# Patient Record
Sex: FEMALE | Race: WHITE | NOT HISPANIC OR LATINO | Employment: OTHER | URBAN - METROPOLITAN AREA
[De-identification: names, ages, dates, MRNs, and addresses within clinical notes are randomized per-mention and may not be internally consistent; named-entity substitution may affect disease eponyms.]

---

## 2017-07-24 ENCOUNTER — ALLSCRIPTS OFFICE VISIT (OUTPATIENT)
Dept: OTHER | Facility: OTHER | Age: 77
End: 2017-07-24

## 2017-09-28 ENCOUNTER — ALLSCRIPTS OFFICE VISIT (OUTPATIENT)
Dept: OTHER | Facility: OTHER | Age: 77
End: 2017-09-28

## 2017-10-31 ENCOUNTER — ALLSCRIPTS OFFICE VISIT (OUTPATIENT)
Dept: OTHER | Facility: OTHER | Age: 77
End: 2017-10-31

## 2017-12-11 ENCOUNTER — ALLSCRIPTS OFFICE VISIT (OUTPATIENT)
Dept: OTHER | Facility: OTHER | Age: 77
End: 2017-12-11

## 2017-12-12 NOTE — PROGRESS NOTES
Assessment    1  Post-menopausal bleeding (627 1) (N95 0)    Plan  Post-menopausal bleeding    · Unlisted Ultrasound  Proc - POC; Status:Active - Perform Order; Requestedfor:58Dhi5433;    Perform: In Office; Order Comments:78 yo  withPMB needs and known myomata needs anatomy review via TVS; Due:35Djq9979; Ordered;bleeding; Ordered By:Delfina Perez;   · Follow-up visit in 1 week Evaluation and Treatment  Follow-up  Status: Hold For -Scheduling  Requested for: 53Gue7742   Ordered;Post-menopausal bleeding; Ordered By: Jenna Siemens Performed:  Due: 31IJJ7711    Discussion/Summary  Discussion Summary:   Schedule embx and TVS  f/u per results  Goals and Barriers: The patient has the current Goals: Delineation of problem  The patent has the current Barriers: 0  Patient's Capacity to Self-Care: Patient is able to Self-Care  History of Present Illness  HPI: Pt is a 77 yo  menopausal for > 15 yrsyrs who presents c/o vaginal bleeding for 1 weeks/months  Pt notes bleeding occurred initially when wiping but now is noted in panties; pt using pantiliner protection 1x daily  Pt notes worse with upright and better with supine positioning  She denies change in voiding  She denies change in BM's  She denies changes in pelvic sensation  Her weight has been stable  She has not been evaluated for this with exam/USG/CT/MRI/UC  Review of Systems   Female ROS: postmenopausal bleeding-- and-- feelings of urinary urgency, but-- no pelvic pain,-- no pelvic pressure-- and-- no dysuria  Focused-Female:  Constitutional: no fever-- and-- no chills  Cardiovascular: the heart rate was not fast   Respiratory: no shortness of breath  Gastrointestinal: constipation, but-- no abdominal pain,-- no nausea,-- no vomiting-- and-- no diarrhea  Genitourinary: as noted in HPI  Musculoskeletal: no limb swelling  Neurological: no confusion  Active Problems  1  Cervical cancer screening (V76 2) (Z12 4)   2   Colon cancer screening (V76 51) (Z12 11)   3  Encounter for gynecological examination without abnormal finding (V72 31) (Z01 419)   4  Inadequate exercise (V69 0) (Z72 3)   5  Urge incontinence of urine (788 31) (N39 41)   6  Urgency of urination (788 63) (R39 15)   7  Urinary frequency (788 41) (R35 0)   8  Urinary urgency (788 63) (R39 15)   9  Visit for screening mammogram (V76 12) (Z12 31)    Past Medical History  1  History of Cancer of the skin, basal cell (173 91) (C44 91)   2  History of Fibroids, intramural (218 1) (D25 1)   3  Denied: History of abnormal cervical Pap smear   4  History of essential hypertension (V12 59) (Z86 79)   5  History of glaucoma (V12 49) (Z86 69)   6  History of hypercholesterolemia (V12 29) (Z86 39)   7  History of pregnancy (V13 29)   8  History of Right nephrolithiasis (592 0) (N20 0)   9  History of Varicella without complication (195 7) (A53 1)  Active Problems And Past Medical History Reviewed: The active problems and past medical history were reviewed and updated today  Surgical History  1  History of Cystoscopy With Insertion Of Ureteral Stent Right   2  History of Diagnostic Cystoscopy   3  History of Endoscopic Laser Therapy   4  History of Removal Of Lesion   5  History of Transurethral Removal Of Internally Dwelling Ureteral Stent  Surgical History Reviewed: The surgical history was reviewed and updated today  Family History  Mother    1  Family history of colon cancer (V16 0) (Z80 0)  Father    2  Family history of colon cancer (V16 0) (Z80 0)    Social History     · Denied: History of Alcohol use   · Denied: History of drug use   · Inadequate exercise (V69 0) (Z72 3)   ·    · Never a smoker  Social History Reviewed: The social history was reviewed and updated today  Current Meds   1  Aspirin 81 MG TABS; TAKE 1 TABLET DAILY; Therapy: (Recorded:22Oct2015) to Recorded   2  Benazepril HCl - 10 MG Oral Tablet; TAKE 1 TABLET DAILY;  Therapy: (Recorded:19Oct2015) to Recorded   3  Choline Fenofibrate 45 MG CPDR; TAKE 1 CAPSULE DAILY; Therapy: (Recorded:19Oct2015) to Recorded   4  Colace 100 MG Oral Capsule; TAKE 1 CAPSULE TWICE DAILY; Therapy: (Recorded:19Oct2015) to Recorded   5  Lipitor 10 MG Oral Tablet; TAKE 1 TABLET DAILY; Therapy: (Recorded:19Oct2015) to Recorded   6  Xalatan 0 005 % Ophthalmic Solution; INSTILL 1 DROP IN BOTH EYES AT BEDTIME; Therapy: (Recorded:19Oct2015) to Recorded    Allergies  1  No Known Drug Allergies    Vitals  Vital Signs    Recorded: 01NPV0042 96:27MU   Systolic 004, Sitting   Diastolic 80, Sitting   Height 5 ft    Weight 173 lb    BMI Calculated 33 79   BSA Calculated 1 76       Physical Exam   Constitutional  General appearance: No acute distress, well appearing and well nourished  -- obese  Pulmonary  Respiratory effort: No increased work of breathing or signs of respiratory distress  Genitourinary  External genitalia: Normal and no lesions appreciated  Vagina: Normal, no lesions or dryness appreciated  -- menstrual like discharge  Urethral meatus: Normal    Bladder: Normal, soft, non-tender and no prolapse or masses appreciated  Cervix: Normal, no palpable masses  -- parous c lesions  Uterus: Normal, non-tender, not enlarged, and no palpable masses  -- limited by tolerance  Adnexa/parametria: Normal, non-tender and no fullness or masses appreciated  -- no masses or tenderness  Anus, perineum, and rectum: Normal sphincter tone, no masses, and no prolapse  No masses or nodularity  Chest no axillary adenopathy  Abdomen  Abdomen: Normal, non-tender, and no organomegaly noted  Liver and spleen: No hepatomegaly or splenomegaly  Examination for hernias: No hernias appreciated  Lymphatic  Palpation of lymph nodes in neck, axillae, groin and/or other locations: No lymphadenopathy or masses noted  -- - adnemopathy  Skin  Skin and subcutaneous tissue: Normal skin turgor and no rashes     Psychiatric Orientation to person, place, and time: Normal    Mood and affect: Normal        Future Appointments    Date/Time Provider Specialty Site   02/08/2018 08:45 AM Franca Contreras MD Urology [address]   01/15/2018 09:15 AM [facility]       Signatures   Electronically signed by : KELY Verma ; Dec 11 2017  3:33PM EST                       (Author)

## 2017-12-14 ENCOUNTER — ALLSCRIPTS OFFICE VISIT (OUTPATIENT)
Dept: OTHER | Facility: OTHER | Age: 77
End: 2017-12-14

## 2017-12-14 NOTE — PROCEDURES
Active Problems  1  Cervical cancer screening (V76 2) (Z12 4)   2  Colon cancer screening (V76 51) (Z12 11)   3  Encounter for gynecological examination without abnormal finding (V72 31) (Z01 419)   4  Inadequate exercise (V69 0) (Z72 3)   5  Post-menopausal bleeding (627 1) (N95 0)   6  Urge incontinence of urine (788 31) (N39 41)   7  Urgency of urination (788 63) (R39 15)   8  Urinary frequency (788 41) (R35 0)   9  Urinary urgency (788 63) (R39 15)   10  Visit for screening mammogram (V76 12) (Z12 31)    Current Meds    1  Aspirin 81 MG TABS; TAKE 1 TABLET DAILY; Therapy: (Recorded:22Oct2015) to Recorded   2  Benazepril HCl - 10 MG Oral Tablet; TAKE 1 TABLET DAILY; Therapy: (Recorded:19Oct2015) to Recorded   3  Choline Fenofibrate 45 MG CPDR; TAKE 1 CAPSULE DAILY; Therapy: (Recorded:19Oct2015) to Recorded   4  Colace 100 MG Oral Capsule; TAKE 1 CAPSULE TWICE DAILY; Therapy: (Recorded:19Oct2015) to Recorded   5  Lipitor 10 MG Oral Tablet; TAKE 1 TABLET DAILY; Therapy: (Recorded:19Oct2015) to Recorded   6  Xalatan 0 005 % Ophthalmic Solution; INSTILL 1 DROP IN BOTH EYES AT BEDTIME; Therapy: (Recorded:19Oct2015) to Recorded    Allergies    1  No Known Drug Allergies    Results/Data  Transvaginal Ultrasound:  Procedure: Transvaginal Pelvic Sonogram -- The study was done today in the office  Indication: Endometrial Polyps,-- Uterine Leiomyoma,-- Heavy Vaginal Bleeding-- and-- PMB  Procedure Note:  Patient placed in dorsal lithotomy position with legs in stirrups  Ultrasound probe was covered wtih a condom, lubricated with water soluable gel  The ultrasound study was then performed  Findings:  Uterus:  UT=99 x 50 x 54 mm  Ovaries:  RT OV=26 x 22 x 16 mm,LT OV=22 x 20 x 14 mm  Endometrium:  31mm**with 2 mass effects within=39 x 35 x 30 mm,18 x 17 x 10 mm  Cervix:  WNL  Pelvic Stuctures:  >>[id number]** with 2 mass effects within  Impression:  Image quality is limited despite TV probe   Uterine outline not well visualized but appears uterus is slightly enlarged for age with thickened inhomogeneous endometrium which contains fluid and two apparent masses  The larger is -- 3x4 cm, hypoechoic, smooth walled while the smaller has a polypoid appearance  Consideration for further evaluation to include sampling and perhaps direct visualization is encouraged  BEO  Patient Status: the patient tolerated the procedure well  Complications:  there were no complications  Future Appointments    Date/Time Provider Specialty Site   02/08/2018 08:45 AM Min Rodriguez MD Urology  [address] [facility]   01/15/2018 09:15 AM [facility] [address]   12/14/2017 07:15 AM KELY Jewell   [address] [facility]       Signatures   Electronically signed by : Mayra Breen, ; Dec 12 2017  4:47PM EST                       (Author)    Electronically signed by : KELY Holland ; Dec 13 2017  8:57AM EST                       (Author)

## 2017-12-22 ENCOUNTER — GENERIC CONVERSION - ENCOUNTER (OUTPATIENT)
Dept: OTHER | Facility: OTHER | Age: 77
End: 2017-12-22

## 2018-01-07 PROBLEM — N95.0 POSTMENOPAUSAL BLEEDING: Status: ACTIVE | Noted: 2018-01-07

## 2018-01-07 PROBLEM — N94.89 ENDOMETRIAL MASS: Status: ACTIVE | Noted: 2018-01-07

## 2018-01-07 PROBLEM — N39.41 URGE INCONTINENCE OF URINE: Status: ACTIVE | Noted: 2017-07-24

## 2018-01-07 NOTE — H&P
HPI:  Pt is a 76 y o  [id number] with No LMP recorded  using menopause for contraception presents c/o endometrial mass and postmenopausal bleeding  PMHx:   Past Medical History:   Diagnosis Date    Basal cell carcinoma     chest, thighs    Endometrial mass 2017    Glaucoma     Hypercholesteremia     Hypertension     Nephrolithiasis     right    Post-menopausal bleeding 2017    Urge incontinence of urine     Uterine fibroid        PSHx:   Past Surgical History:   Procedure Laterality Date    CYSTOSCOPY W/ LASER LITHOTRIPSY Right     CYSTOSCOPY W/ URETERAL STENT PLACEMENT Right     CYSTOSCOPY W/ URETERAL STENT REMOVAL Right     MALIGNANT SKIN LESION EXCISION      BCCA chest and thighs       Meds:   No prescriptions prior to admission  Allergies: No Known Allergies    Social Hx:    Social History     Social History    Marital status: /Civil Union     Spouse name: N/A    Number of children: N/A    Years of education: N/A     Social History Main Topics    Smoking status: Never Smoker    Smokeless tobacco: Never Used    Alcohol use No    Drug use: No    Sexual activity: Not Currently     Partners: Male     Other Topics Concern    None     Social History Narrative    None       Ob Hx:   OB History    Para Term  AB Living   2 2 2     2   SAB TAB Ectopic Multiple Live Births           2      # Outcome Date GA Lbr Cristobal/2nd Weight Sex Delivery Anes PTL Lv   2 Term            1 Term                   Gyn HX:  denies STD, abnormal pap, significant dysmenorrhea or irregular menses    Fm Hx:   Family History   Problem Relation Age of Onset    Colon cancer Mother     Rectal cancer Father        ROS:  Review of Systems   Constitutional: Negative for activity change, appetite change and fever  Respiratory: Negative for shortness of breath  Cardiovascular: Negative for leg swelling     Gastrointestinal: Negative for abdominal distention, abdominal pain, blood in stool, nausea and vomiting  Endocrine: Negative for heat intolerance  Genitourinary: Positive for vaginal bleeding  Musculoskeletal: Negative for myalgias  Skin: Negative for color change  Hematological: Negative for adenopathy  VS:  There were no vitals taken for this visit  Exam:    Physical Exam    abd        soft, NT, ND, no palpable masses or organomegally           vulva      normal external genitalia for age    vagina    rugae  flattening of rugae and discharge  bloody    cervix     parous and no lesions     uterus     limited by tolerance and NSSC, AF, NT, mobile    adnexa   no masses or tenderness     RV         no masses or nodularity    A/P: 76 y o  [id number] with No LMP recorded  with postmenopausal bleeding  for D&C c with hysteroscopy, hysteroscopic polypectomy and hysterosocpic myomectomy   The risks (infection, bleeding, transfusion, damage to adjacent organs requiring immediate and/or delayed repair, clots inside blood vessels, need to complete procedure using alternative approach, procedural failure, worsening of pre-exisiting medical condition, and death) and alternatives (see outpatient record) have been discussed and patient desires to proceed with D&C c with hysteroscopy, hysteroscopic polypectomy and hysterosocpic myomectomy at [facility] on 10 January 2018

## 2018-01-07 NOTE — DISCHARGE SUMMARY
Post-Operative Instructions---Minor Surgery    Things to call for:          temperature of 100 4*F or more  worsening pain  foul smelling discharge  heavy vaginal bleeding  persistent nausea or vomiting    You may experience:   watery or blood-tinged vaginal drainage for several days to a few weeks depending upon the type of procedure performed  mild cramping which should respond to Motrin or Aleve  mild nausea related to the anesthetic and which should steadily improve    You may:                     walk comfortable distances  shower the day following the procedure  eat as you normally do   do moderate housework (daily household requirements but please let the vacuuming wait a few days)  return to work in 1-2 days (depending on the nature of your work and the procedure performed)  resume usual activities in one week; two weeks if you've undergone LEEP or conization    Please do not:             have sexual relations for at least one week  If you've undergone a LEEP or conization please abstain for six weeks  place anything in the vagina for at least one week (six weeks for LEEP or conization)  lift or push heavy items (> 20 lbs  ) for at least two weeks if you've undergone LEEP or conization  ESSURE patients please continue to use an effective method of birth control until we tell you otherwise

## 2018-01-09 ENCOUNTER — ANESTHESIA EVENT (OUTPATIENT)
Dept: PERIOP | Facility: HOSPITAL | Age: 78
End: 2018-01-09
Payer: MEDICARE

## 2018-01-09 RX ORDER — BENAZEPRIL HYDROCHLORIDE 10 MG/1
10 TABLET ORAL EVERY EVENING
COMMUNITY

## 2018-01-09 RX ORDER — LATANOPROST 50 UG/ML
1 SOLUTION/ DROPS OPHTHALMIC
COMMUNITY
End: 2021-07-08

## 2018-01-09 RX ORDER — ATORVASTATIN CALCIUM 10 MG/1
10 TABLET, FILM COATED ORAL DAILY
COMMUNITY
End: 2021-07-08

## 2018-01-09 RX ORDER — ASPIRIN 81 MG/1
81 TABLET ORAL DAILY
COMMUNITY
End: 2019-08-05 | Stop reason: ALTCHOICE

## 2018-01-09 RX ORDER — DOCUSATE SODIUM 100 MG/1
100 CAPSULE, LIQUID FILLED ORAL EVERY EVENING
COMMUNITY
End: 2019-08-05 | Stop reason: ALTCHOICE

## 2018-01-09 NOTE — PRE-PROCEDURE INSTRUCTIONS
Pre-Surgery Instructions:   Medication Instructions    aspirin (ECOTRIN LOW STRENGTH) 81 mg EC tablet Patient was instructed to contact Physician for medication instruction   atorvastatin (LIPITOR) 10 mg tablet Instructed patient per Anesthesia Guidelines   benazepril (LOTENSIN) 10 mg tablet Instructed patient per Anesthesia Guidelines   choline fenofibrate (TRILIPIX) 45 MG capsule Instructed patient per Anesthesia Guidelines   docusate sodium (COLACE) 100 mg capsule Instructed patient per Anesthesia Guidelines   latanoprost (XALATAN) 0 005 % ophthalmic solution Instructed patient per Anesthesia Guidelines  No meds needed am bonnie dozier per anesthesia  Pt instructed to call md office and make aware had not stopped asa prior to surgery

## 2018-01-10 ENCOUNTER — HOSPITAL ENCOUNTER (OUTPATIENT)
Facility: HOSPITAL | Age: 78
Setting detail: OUTPATIENT SURGERY
Discharge: HOME | End: 2018-01-10
Attending: OBSTETRICS & GYNECOLOGY | Admitting: OBSTETRICS & GYNECOLOGY
Payer: MEDICARE

## 2018-01-10 ENCOUNTER — ANESTHESIA (OUTPATIENT)
Dept: PERIOP | Facility: HOSPITAL | Age: 78
End: 2018-01-10
Payer: MEDICARE

## 2018-01-10 VITALS
TEMPERATURE: 97.7 F | SYSTOLIC BLOOD PRESSURE: 147 MMHG | RESPIRATION RATE: 16 BRPM | OXYGEN SATURATION: 100 % | BODY MASS INDEX: 31.32 KG/M2 | WEIGHT: 165.9 LBS | HEART RATE: 77 BPM | HEIGHT: 61 IN | DIASTOLIC BLOOD PRESSURE: 79 MMHG

## 2018-01-10 DIAGNOSIS — N95.0 POSTMENOPAUSAL BLEEDING: ICD-10-CM

## 2018-01-10 DIAGNOSIS — N94.89 OTHER SPECIFIED CONDITIONS ASSOCIATED WITH FEMALE GENITAL ORGANS AND MENSTRUAL CYCLE: ICD-10-CM

## 2018-01-10 LAB
ABO GROUP BLD: NORMAL
ANION GAP SERPL CALCULATED.3IONS-SCNC: 8 MMOL/L (ref 4–13)
ATRIAL RATE: 82 BPM
BLD GP AB SCN SERPL QL: NEGATIVE
BUN SERPL-MCNC: 20 MG/DL (ref 5–25)
CALCIUM SERPL-MCNC: 9.4 MG/DL (ref 8.3–10.1)
CHLORIDE SERPL-SCNC: 106 MMOL/L (ref 100–108)
CO2 SERPL-SCNC: 27 MMOL/L (ref 21–32)
CREAT SERPL-MCNC: 0.69 MG/DL (ref 0.6–1.3)
GFR SERPL CREATININE-BSD FRML MDRD: 83 ML/MIN/1.73SQ M
GLUCOSE P FAST SERPL-MCNC: 96 MG/DL (ref 65–99)
GLUCOSE SERPL-MCNC: 96 MG/DL (ref 65–140)
P AXIS: 47 DEGREES
POTASSIUM SERPL-SCNC: 3.5 MMOL/L (ref 3.5–5.3)
PR INTERVAL: 174 MS
QRS AXIS: -6 DEGREES
QRSD INTERVAL: 70 MS
QT INTERVAL: 386 MS
QTC INTERVAL: 450 MS
RH BLD: POSITIVE
SODIUM SERPL-SCNC: 141 MMOL/L (ref 136–145)
SPECIMEN EXPIRATION DATE: NORMAL
T WAVE AXIS: 12 DEGREES
VENTRICULAR RATE: 82 BPM

## 2018-01-10 PROCEDURE — 93005 ELECTROCARDIOGRAM TRACING: CPT

## 2018-01-10 PROCEDURE — 86900 BLOOD TYPING SEROLOGIC ABO: CPT | Performed by: OBSTETRICS & GYNECOLOGY

## 2018-01-10 PROCEDURE — 80048 BASIC METABOLIC PNL TOTAL CA: CPT | Performed by: OBSTETRICS & GYNECOLOGY

## 2018-01-10 PROCEDURE — 86850 RBC ANTIBODY SCREEN: CPT | Performed by: OBSTETRICS & GYNECOLOGY

## 2018-01-10 PROCEDURE — 86901 BLOOD TYPING SEROLOGIC RH(D): CPT | Performed by: OBSTETRICS & GYNECOLOGY

## 2018-01-10 PROCEDURE — 88305 TISSUE EXAM BY PATHOLOGIST: CPT | Performed by: OBSTETRICS & GYNECOLOGY

## 2018-01-10 RX ORDER — MAGNESIUM HYDROXIDE 1200 MG/15ML
LIQUID ORAL AS NEEDED
Status: DISCONTINUED | OUTPATIENT
Start: 2018-01-10 | End: 2018-01-10 | Stop reason: HOSPADM

## 2018-01-10 RX ORDER — MIDAZOLAM HYDROCHLORIDE 1 MG/ML
INJECTION INTRAMUSCULAR; INTRAVENOUS AS NEEDED
Status: DISCONTINUED | OUTPATIENT
Start: 2018-01-10 | End: 2018-01-10 | Stop reason: SURG

## 2018-01-10 RX ORDER — PROPOFOL 10 MG/ML
INJECTION, EMULSION INTRAVENOUS AS NEEDED
Status: DISCONTINUED | OUTPATIENT
Start: 2018-01-10 | End: 2018-01-10 | Stop reason: SURG

## 2018-01-10 RX ORDER — KETOROLAC TROMETHAMINE 30 MG/ML
INJECTION, SOLUTION INTRAMUSCULAR; INTRAVENOUS AS NEEDED
Status: DISCONTINUED | OUTPATIENT
Start: 2018-01-10 | End: 2018-01-10 | Stop reason: SURG

## 2018-01-10 RX ORDER — SODIUM CHLORIDE 9 MG/ML
125 INJECTION, SOLUTION INTRAVENOUS CONTINUOUS
Status: DISCONTINUED | OUTPATIENT
Start: 2018-01-10 | End: 2018-01-10 | Stop reason: HOSPADM

## 2018-01-10 RX ORDER — FENTANYL CITRATE 50 UG/ML
INJECTION, SOLUTION INTRAMUSCULAR; INTRAVENOUS AS NEEDED
Status: DISCONTINUED | OUTPATIENT
Start: 2018-01-10 | End: 2018-01-10 | Stop reason: SURG

## 2018-01-10 RX ORDER — MEPERIDINE HYDROCHLORIDE 50 MG/ML
12.5 INJECTION INTRAMUSCULAR; INTRAVENOUS; SUBCUTANEOUS AS NEEDED
Status: DISCONTINUED | OUTPATIENT
Start: 2018-01-10 | End: 2018-01-10 | Stop reason: HOSPADM

## 2018-01-10 RX ORDER — ACETAMINOPHEN 325 MG/1
650 TABLET ORAL EVERY 6 HOURS PRN
Status: DISCONTINUED | OUTPATIENT
Start: 2018-01-10 | End: 2018-01-10 | Stop reason: HOSPADM

## 2018-01-10 RX ORDER — FENTANYL CITRATE/PF 50 MCG/ML
50 SYRINGE (ML) INJECTION
Status: DISCONTINUED | OUTPATIENT
Start: 2018-01-10 | End: 2018-01-10 | Stop reason: HOSPADM

## 2018-01-10 RX ORDER — ONDANSETRON 2 MG/ML
INJECTION INTRAMUSCULAR; INTRAVENOUS AS NEEDED
Status: DISCONTINUED | OUTPATIENT
Start: 2018-01-10 | End: 2018-01-10 | Stop reason: SURG

## 2018-01-10 RX ORDER — ALBUTEROL SULFATE 2.5 MG/3ML
2.5 SOLUTION RESPIRATORY (INHALATION) ONCE AS NEEDED
Status: DISCONTINUED | OUTPATIENT
Start: 2018-01-10 | End: 2018-01-10 | Stop reason: HOSPADM

## 2018-01-10 RX ADMIN — SODIUM CHLORIDE 125 ML/HR: 0.9 INJECTION, SOLUTION INTRAVENOUS at 12:25

## 2018-01-10 RX ADMIN — FENTANYL CITRATE 50 MCG: 50 INJECTION INTRAMUSCULAR; INTRAVENOUS at 14:40

## 2018-01-10 RX ADMIN — ACETAMINOPHEN 650 MG: 325 TABLET, FILM COATED ORAL at 16:58

## 2018-01-10 RX ADMIN — FENTANYL CITRATE 50 MCG: 50 INJECTION INTRAMUSCULAR; INTRAVENOUS at 14:30

## 2018-01-10 RX ADMIN — FENTANYL CITRATE 50 MCG: 50 INJECTION INTRAMUSCULAR; INTRAVENOUS at 14:20

## 2018-01-10 RX ADMIN — MIDAZOLAM HYDROCHLORIDE 1 MG: 1 INJECTION, SOLUTION INTRAMUSCULAR; INTRAVENOUS at 14:24

## 2018-01-10 RX ADMIN — PROPOFOL 130 MG: 10 INJECTION, EMULSION INTRAVENOUS at 14:27

## 2018-01-10 RX ADMIN — SODIUM CHLORIDE: 0.9 INJECTION, SOLUTION INTRAVENOUS at 14:30

## 2018-01-10 RX ADMIN — ONDANSETRON HYDROCHLORIDE 4 MG: 2 INJECTION, SOLUTION INTRAVENOUS at 14:33

## 2018-01-10 RX ADMIN — DEXAMETHASONE SODIUM PHOSPHATE 4 MG: 10 INJECTION INTRAMUSCULAR; INTRAVENOUS at 14:32

## 2018-01-10 RX ADMIN — KETOROLAC TROMETHAMINE 30 MG: 30 INJECTION, SOLUTION INTRAMUSCULAR at 15:27

## 2018-01-10 RX ADMIN — SODIUM CHLORIDE: 0.9 INJECTION, SOLUTION INTRAVENOUS at 14:21

## 2018-01-10 RX ADMIN — FENTANYL CITRATE 50 MCG: 50 INJECTION INTRAMUSCULAR; INTRAVENOUS at 14:27

## 2018-01-10 NOTE — PROGRESS NOTES
1230-  Patient  unable to give full urine specimen  Iv started to give fluids and hydrate patient for specimen  1310 Patient still unable to void  Notified Dr Eric Douglass, will obtain specimen in or

## 2018-01-10 NOTE — DISCHARGE SUMMARY
Discharge Summary - Dago Jerry 76 y o  female MRN: [id number]    Unit/Bed#: OR POOL Encounter: [id number]    Admission Date: 1/10/2018                Discharge date:     Admitting Diagnosis:   Endometrial mass  postmenopausal bleeding    Procedures Performed:   Endometrial mass  postmenopausal bleeding    Hospital Course:  Pt underwent hysterosopic polypectomy, myomectomy and D&C     Complications: none    Condition at Discharge: stable     Discharge instructions/Information to patient and family:   See after visit summary for information provided to patient and family  Provisions for Follow-Up Care:  Please call  Barrett Florian MD   for appointment in 1 week    Disposition: Home    Planned Readmission: No      Discharge Medications: The patient returns to her pre-hospital outpatient medications  She may use motrin 400-600 mg every 4-6 hours as needed for cramping

## 2018-01-10 NOTE — PROGRESS NOTES
Active Problems    1  Cervical cancer screening (V76 2) (Z12 4)   2  Colon cancer screening (V76 51) (Z12 11)   3  Encounter for gynecological examination without abnormal finding (V72 31) (Z01 419)   4  Inadequate exercise (V69 0) (Z72 3)   5  Urge incontinence of urine (788 31) (N39 41)   6  Urgency of urination (788 63) (R39 15)   7  Urinary frequency (788 41) (R35 0)   8  Visit for screening mammogram (V76 12) (Z12 31)    Current Meds   1  Aspirin 81 MG TABS; TAKE 1 TABLET DAILY; Therapy: (Recorded:22Oct2015) to Recorded   2  Benazepril HCl - 10 MG Oral Tablet; TAKE 1 TABLET DAILY; Therapy: (Recorded:19Oct2015) to Recorded   3  Choline Fenofibrate 45 MG CPDR; TAKE 1 CAPSULE DAILY; Therapy: (Recorded:19Oct2015) to Recorded   4  Colace 100 MG Oral Capsule; TAKE 1 CAPSULE TWICE DAILY; Therapy: (Recorded:19Oct2015) to Recorded   5  Lipitor 10 MG Oral Tablet; TAKE 1 TABLET DAILY; Therapy: (Recorded:19Oct2015) to Recorded   6  Xalatan 0 005 % Ophthalmic Solution; INSTILL 1 DROP IN BOTH EYES AT BEDTIME; Therapy: (Recorded:41Zxi4199) to Recorded    Allergies    1  No Known Drug Allergies    Procedure    Procedure: Percutaneous Tibial Nerve Stimulation (PTNS)   Date onset of symptoms 2-3 YEARS AGO  No behavior modification  No E-Stim  Drug 1: VESICARE GAVE HER SIDE EFFECTS SHE COULDN'T TOLERATE  Session number: 1 month f/u     Patient Goals and Progress   Decreased urgency  Decreased frequency  No accidents  Sleeps through the night  No related health and social factors  Caffeine - cups per day: 0  Alcohol - number of drinks per day: 0  Daytime voids - number per day: 4-5  Nighttime voids - number per night: 1-2  Urgency: 2  Incontinence - episodes per day: 2  Treatment Plan   Do not increase fluids  Decrease fluids  Do not decrease caffeine  Urge reduction techniques:   No kegels  Toilet every 3-4 hours  No fluids before bed       Ankle Used: Left     Treatment settin  Duration of treatment: 30 MINUTES  Lead lot #: [id number]  Expiration Date: 2019  Feeling/Response: Toe flex and foot sensation      Assessment    1  Urge incontinence of urine (788 31) (N39 41)   2  Urinary frequency (788 41) (R35 0)   3  Urinary urgency (788 63) (R39 15)    Plan  Urge incontinence of urine, Urgency of urination, Urinary frequency    · Follow-up visit in 1 month Evaluation and Treatment  Follow-up  Status: Complete  Done:  72MUN1591   Ordered; For: Urge incontinence of urine, Urgency of urination, Urinary frequency;  Ordered By: Gael Cruz Performed:  Due: 37INU4460    Future Appointments    Date/Time Provider Specialty Site   2018 08:45 AM Gael Cruz MD Urology  [facility]   2017 09:45 AM [facility] [address]     Signatures   Electronically signed by : Jo Aguilar RN; Oct 31 2017  9:31AM EST                       (Author)    Electronically signed by : Arlette Ricks MD; Oct 31 2017  1:12PM EST                       (Author)

## 2018-01-10 NOTE — ANESTHESIA POSTPROCEDURE EVALUATION
Post-Op Assessment Note      CV Status:  Stable    Mental Status:  Alert and awake    Hydration Status:  Euvolemic    PONV Controlled:  Controlled    Airway Patency:  Patent    Post Op Vitals Reviewed: Yes          Staff: Anesthesiologist           /89 (01/10/18 1550)    Temp      Pulse 72 (01/10/18 1550)   Resp 13 (01/10/18 1550)    SpO2 97 % (01/10/18 1550)

## 2018-01-10 NOTE — ANESTHESIA PREPROCEDURE EVALUATION
Review of Systems/Medical History  Patient summary reviewed  Chart reviewed  No history of anesthetic complications     Cardiovascular  EKG reviewed, Hyperlipidemia, Hypertension controlled,    Pulmonary  Negative pulmonary ROS ,        GI/Hepatic  Negative GI/hepatic ROS          Kidney stones,        Endo/Other  Negative endo/other ROS      GYN  Negative gynecology ROS          Hematology  Negative hematology ROS      Musculoskeletal  Negative musculoskeletal ROS        Neurology    No visual impairment  Comment: glaucoma Psychology   Negative psychology ROS            Physical Exam    Airway    Mallampati score: II  TM Distance: >3 FB  Neck ROM: full     Dental   Comment: Capped #12, molars,     Cardiovascular  Rhythm: regular, Rate: normal, Cardiovascular exam normal    Pulmonary  Pulmonary exam normal Breath sounds clear to auscultation,     Other Findings        Anesthesia Plan  ASA Score- 2     Anesthesia Type- general with ASA Monitors  Additional Monitors:   Airway Plan: LMA  Plan Factors-    Induction- intravenous  Postoperative Plan-     Informed Consent- Anesthetic plan and risks discussed with patient

## 2018-01-10 NOTE — OP NOTE
OPERATIVE REPORT  PATIENT NAME: Jm Epstein    :  1940  MRN: [id number]  Pt Location: [facility] OR ROOM 01    SURGERY DATE: 1/10/2018    Surgeon(s) and Role:     * Gurdeep Koehler MD - Primary     * Dante García MD - Assisting    Preop Diagnosis:  Postmenopausal bleeding [N95 0]  Other specified conditions associated with female genital organs and menstrual cycle [N94 89]    Post-Op Diagnosis Codes:     * Postmenopausal bleeding [N95 0]     * Other specified conditions associated with female genital organs and menstrual cycle [N94 89]    Procedure(s) (LRB):  HYSTEROSCOPY, D&C, HYSTEROSCOPIC MYOMECTOMY (N/A)    Specimen(s):  ID Type Source Tests Collected by Time Destination   1 : EMC & Polyp& Fibroid Tissue Endometrium TISSUE EXAM Gurdeep Koehler MD 1/10/2018 1505        Estimated Blood Loss:   Minimal    Drains: none    Fluid deficit: 1017 ml, with significant spillage over the floor     Anesthesia Type:   LMA    Operative Indications:   Pt is a 76 y o  [id number] presents c/o endometrial mass on USG and SIS for postmenopausal bleeding  Embx in office benign  Pt with hx of myomata     Operative Findings:  --Three cm fundal myoma with bands to lateral walls and with widely based attachment anteriorly  --5x12 mm polyp avulsed with dilation of endocervix  --smooth endometrial cavity otherwise    Complications:   none    Procedure and Technique:  The patient was take to the OR where her identity was confirmed and she was positioned, prepped and draped in the usual manner for vaginal surgery  Her bladder was emptied of 75 ml straw-yellow urine  A long  weighted speculum and [place] were used to isolate the cervix  It was grasped with a single tooth tenaculum  The cervix was dilated carefully to a 14 Benjamin dilator   The hysteroscope was carefully passed through the cervix into the uterine cavity  The fundus was palpated  Fluid flow was adjusted and the cavity visualized with the findings as noted above  The polyp which on USG was in the lower uterine segment was noted to be free floating  The hysteroscopy was removed and polyp forceps were used to grasp and remove the polyp  The hysteroscope was reinserted with the Myosure in place  The device was then used to sequentially morcellate from inferior to anterior in a linear fashion from side to side  The base was reached however the device malfunctioned at this time and the morcellation was terminated  Curettage was briefly undertaken and the specimen passed off the field  Appropriate hemostasis was confirmed and the instruments removed from the uterus  The tenaculum was removed from the cervix; hemostasis at the site and through the canal was appropriate  All instruments were removed from the vagina  The patient was cleansed, returned to the supine position, awakened from her general anesthetic and taken to the recovery area in stable condition          Patient Disposition:  PACU , hemodynamically stable and extubated and stable    SIGNATURE: Arturo Loza MD  DATE: January 10, 2018  TIME: 3:54 PM

## 2018-01-15 ENCOUNTER — ALLSCRIPTS OFFICE VISIT (OUTPATIENT)
Dept: OTHER | Facility: OTHER | Age: 78
End: 2018-01-15

## 2018-01-17 NOTE — PROGRESS NOTES
Active Problems   1  Cervical cancer screening (V76 2) (Z12 4)  2  Colon cancer screening (V76 51) (Z12 11)  3  Encounter for gynecological examination without abnormal finding (V72 31) (Z01 419)  4  Inadequate exercise (V69 0) (Z72 3)  5  Urge incontinence of urine (788 31) (N39 41)  6  Urgency of urination (788 63) (R39 15)  7  Urinary frequency (788 41) (R35 0)  8  Visit for screening mammogram (V76 12) (Z12 31)    Current Meds  1  Aspirin 81 MG TABS; TAKE 1 TABLET DAILY; Therapy: (Recorded:22Oct2015) to Recorded  2  Benazepril HCl - 10 MG Oral Tablet; TAKE 1 TABLET DAILY; Therapy: (Recorded:19Oct2015) to Recorded  3  Choline Fenofibrate 45 MG CPDR; TAKE 1 CAPSULE DAILY; Therapy: (Recorded:19Oct2015) to Recorded  4  Colace 100 MG Oral Capsule; TAKE 1 CAPSULE TWICE DAILY; Therapy: (Recorded:19Oct2015) to Recorded  5  Lipitor 10 MG Oral Tablet; TAKE 1 TABLET DAILY; Therapy: (Recorded:19Oct2015) to Recorded  6  Xalatan 0 005 % Ophthalmic Solution; INSTILL 1 DROP IN BOTH EYES AT BEDTIME; Therapy: (Recorded:52Mtt2124) to Recorded    Allergies   1  No Known Drug Allergies    Procedure    Procedure: Percutaneous Tibial Nerve Stimulation (PTNS)   Date onset of symptoms 2-3 YEARS AGO  No behavior modification  No biofeedback  No E-Stim  Drug 1: VESICARE GAVE HER SIDE EFFECTS SHE COULDN'T TOLERATE  Session number: 1 month f/u     Patient Goals and Progress   Decreased urgency  Decreased frequency  No accidents  Sleeps through the night  No related health and social factors  Caffeine - cups per day: 0  Alcohol - number of drinks per day: 0  Daytime voids - number per day: 4-5  Nighttime voids - number per night: 1-2  Urgency: 2  Incontinence - episodes per day: 2  Treatment Plan   Do not increase fluids  Decrease fluids  Do not decrease caffeine  Urge reduction techniques:   No kegels  Toilet every 3-4 hours  No fluids before bed       Ankle Used: Left     Treatment settin  Duration of treatment: 30 MINUTES  Lead lot #: [id number]  Expiration Date: 2019  Feeling/Response: Toe flex and foot sensation      Assessment   1  Urgency of urination (788 63) (R39 15)  2  Urinary frequency (788 41) (R35 0)  3  Urge incontinence of urine (788 31) (N39 41)    Plan  Urge incontinence of urine, Urgency of urination, Urinary frequency    · Follow-up visit in 1 month Evaluation and Treatment  Follow-up  Status: Complete -  Retrospective Authorization  Done: [id number]  Ordered; For: Urge incontinence of urine, Urgency of urination, Urinary frequency;     Ordered By: Gael Cruz  Performed:   Due: 33MXU5488; Last Updated By:   Paulette Horowitz; 2017 1:59:59 PM    Future Appointments    Date/Time Provider Specialty Site   2018 08:45 AM Gael Cruz MD Urology [address]   [facility]31[facility]2017 09[facility]00 [facility]     Signatures   Electronically signed by : Jo Aguilar RN; Sep 28 2017  1:31PM EST                       (Author)    Electronically signed by : Arlette Ricks MD; Sep 28 2017  5:30PM EST                       (Author)

## 2018-01-18 ENCOUNTER — GENERIC CONVERSION - ENCOUNTER (OUTPATIENT)
Dept: OTHER | Facility: OTHER | Age: 78
End: 2018-01-18

## 2018-01-21 ENCOUNTER — GENERIC CONVERSION - ENCOUNTER (OUTPATIENT)
Dept: OTHER | Facility: OTHER | Age: 78
End: 2018-01-21

## 2018-01-23 VITALS
BODY MASS INDEX: 33.77 KG/M2 | SYSTOLIC BLOOD PRESSURE: 130 MMHG | DIASTOLIC BLOOD PRESSURE: 80 MMHG | HEIGHT: 60 IN | WEIGHT: 172 LBS

## 2018-01-23 VITALS
SYSTOLIC BLOOD PRESSURE: 132 MMHG | WEIGHT: 173 LBS | DIASTOLIC BLOOD PRESSURE: 80 MMHG | BODY MASS INDEX: 33.96 KG/M2 | HEIGHT: 60 IN

## 2018-01-23 NOTE — MISCELLANEOUS
Message  12/19/17 1455: LMOVM that (embx) test results are benign  PLease keep appt 22 Dec to review options given USG findings  BEO      Plan  Post-menopausal bleeding    · (Q) TISSUE PATHOLOGY; Status:Active; Requested for:[id number];    · Endometrial Biopsy - POC; Status:Active; Requested for:93Lqn4852;    · Call [phone] if:  You have pain in the lower abdominal area ; Status:Complete;    Done: [id number]    Signatures   Electronically signed by : KELY Floyd ; Dec 19 2017  2:56PM EST                       (Author)

## 2018-01-23 NOTE — PROGRESS NOTES
Active Problems   1  Cervical cancer screening (V76 2) (Z12 4)  2  Colon cancer screening (V76 51) (Z12 11)  3  Encounter for gynecological examination without abnormal finding (V72 31) (Z01 419)  4  Inadequate exercise (V69 0) (Z72 3)  5  Urge incontinence of urine (788 31) (N39 41)  6  Urgency of urination (788 63) (R39 15)  7  Urinary frequency (788 41) (R35 0)  8  Urinary urgency (788 63) (R39 15)  9  Visit for screening mammogram (V76 12) (Z12 31)    Current Meds  1  Aspirin 81 MG TABS; TAKE 1 TABLET DAILY; Therapy: (Recorded:22Oct2015) to Recorded  2  Benazepril HCl - 10 MG Oral Tablet; TAKE 1 TABLET DAILY; Therapy: (Recorded:19Oct2015) to Recorded  3  Choline Fenofibrate 45 MG CPDR; TAKE 1 CAPSULE DAILY; Therapy: (Recorded:19Oct2015) to Recorded  4  Colace 100 MG Oral Capsule; TAKE 1 CAPSULE TWICE DAILY; Therapy: (Recorded:19Oct2015) to Recorded  5  Lipitor 10 MG Oral Tablet; TAKE 1 TABLET DAILY; Therapy: (Recorded:19Oct2015) to Recorded  6  Xalatan 0 005 % Ophthalmic Solution; INSTILL 1 DROP IN BOTH EYES AT BEDTIME; Therapy: (Recorded:19Oct2015) to Recorded    Allergies   1  No Known Drug Allergies    Procedure    Procedure: Percutaneous Tibial Nerve Stimulation (PTNS)   Date onset of symptoms 2-3 YEARS AGO  No behavior modification  No biofeedback  No E-Stim  Drug 1: VESICARE HAD SIDE EFFECTS SHE COULDN'T TOLERATE  Session number: 1 month f/u     Patient Goals and Progress   Decreased urgency  Decreased frequency  No accidents  Sleeps through the night  No related health and social factors  Caffeine - cups per day: 0  Alcohol - number of drinks per day: 0  Daytime voids - number per day: 4-5  Nighttime voids - number per night: 1-2  Urgency: 2  Incontinence - episodes per day: 2  Treatment Plan   Do not increase fluids  Decrease fluids  Do not decrease caffeine  Urge reduction techniques:   No kegels  Toilet every 3-4 hours      No fluids before bed  Ankle Used: Left     Treatment settin  Duration of treatment: 30 MINUTES  Lead lot #: [id number]  Expiration Date: 2020  Feeling/Response: Toe flex and foot sensation      Assessment   1  Urgency of urination (788 63) (R39 15)  2  Urinary frequency (788 41) (R35 0)  3  Urge incontinence of urine (788 31) (N39 41)    Plan  Urge incontinence of urine, Urgency of urination, Urinary frequency    · Follow-up visit in 1 month Evaluation and Treatment  Follow-up  Status: Hold For -  Scheduling  Requested for: [id number]  Ordered; For: Urge incontinence of urine, Urgency of urination, Urinary frequency; Ordered By: Margo Pritchard  Performed:   Due: 43OWA6111    Future Appointments    Date/Time Provider Specialty Site   2018 08:45 AM Margo Pritchard MD Urology [address]   01/15/2018 09:15 AM [facility] [place] [address]   2017 03:00 PM KELY Escamilla   Obstetrics/Gynecology [address] OB/GYN     Signatures   Electronically signed by : Andrew Hubbard RN; Dec 11 2017 10:20AM EST                       (Author)    Electronically signed by : Dwaine Scott MD; Dec 11 2017  1:37PM EST                       (Author)

## 2018-01-23 NOTE — PROGRESS NOTES
Active Problems    1  Cervical cancer screening (V76 2) (Z12 4)   2  Colon cancer screening (V76 51) (Z12 11)   3  Encounter for gynecological examination without abnormal finding (V72 31) (Z01 419)   4  Endometrial mass (625 8) (N94 89)   5  Inadequate exercise (V69 0) (Z72 3)   6  Post-menopausal bleeding (627 1) (N95 0)   7  Urge incontinence of urine (788 31) (N39 41)   8  Urinary frequency (788 41) (R35 0)   9  Urinary urgency (788 63) (R39 15)   10  Visit for screening mammogram (V76 12) (Z12 31)    Current Meds   1  Aspirin 81 MG TABS; TAKE 1 TABLET DAILY; Therapy: (Recorded:22Oct2015) to Recorded   2  Benazepril HCl - 10 MG Oral Tablet; TAKE 1 TABLET DAILY; Therapy: (Recorded:19Oct2015) to Recorded   3  Choline Fenofibrate 45 MG CPDR; TAKE 1 CAPSULE DAILY; Therapy: (Recorded:19Oct2015) to Recorded   4  Colace 100 MG Oral Capsule; TAKE 1 CAPSULE TWICE DAILY; Therapy: (Recorded:19Oct2015) to Recorded   5  Lipitor 10 MG Oral Tablet; TAKE 1 TABLET DAILY; Therapy: (Recorded:19Oct2015) to Recorded   6  Xalatan 0 005 % Ophthalmic Solution; INSTILL 1 DROP IN BOTH EYES AT BEDTIME; Therapy: (Recorded:19Oct2015) to Recorded    Allergies    1  No Known Drug Allergies    Procedure    Procedure: Percutaneous Tibial Nerve Stimulation (PTNS)   Date onset of symptoms 2-3 YEARS AGO  No behavior modification  No biofeedback  No E-Stim  Drug 1: VESICARE HAD SIDE EFFECTS SHE COULDN'T TOLERATE  Session number: 1 month f/u     Patient Goals and Progress   Decreased urgency  Decreased frequency  No accidents  Sleeps through the night  No related health and social factors  Caffeine - cups per day: 0  Alcohol - number of drinks per day: 0  Daytime voids - number per day: 4-5  Nighttime voids - number per night: 2-3  Urgency: 2  Incontinence - episodes per day: 2  Treatment Plan   Do not increase fluids  Decrease fluids  Do not decrease caffeine      Urge reduction techniques:   No kegels  Toilet every 3-4 hours  No fluids before bed  Ankle Used: Left     Treatment settin  Duration of treatment: 30 MINUTES  Lead lot #: [id number]  Expiration Date: 2020  Feeling/Response: Toe flex and foot sensation      Assessment    1  Urinary urgency (788 63) (R39 15)   2  Urinary frequency (788 41) (R35 0)   3  Urge incontinence of urine (788 31) (N39 41)    Plan  Urge incontinence of urine, Urinary frequency, Urinary urgency    · Follow-up visit in 1 month Evaluation and Treatment  Follow-up  Status: Hold For -  Scheduling  Requested for: [id number]   Ordered; For: Urge incontinence of urine, Urinary frequency, Urinary urgency; Ordered By: Opal Thapa Performed:  Due: 39RLU7987    Future Appointments    Date/Time Provider Specialty Site   2018 08:45 AM Opal Thapa MD Urology [address]   2018 08:15 AM KELY Watkins   Obstetrics/Gynecology [address] OB/GYN     Signatures   Electronically signed by : Kemar Jin RN; Paulo 15 2018  9:37AM EST                       (Author)    Electronically signed by : Sherryle Alice, MD; 2018  8:41AM EST                       (Author)

## 2018-01-24 VITALS
SYSTOLIC BLOOD PRESSURE: 138 MMHG | HEIGHT: 60 IN | WEIGHT: 172 LBS | DIASTOLIC BLOOD PRESSURE: 80 MMHG | BODY MASS INDEX: 33.77 KG/M2

## 2018-01-24 VITALS
DIASTOLIC BLOOD PRESSURE: 80 MMHG | SYSTOLIC BLOOD PRESSURE: 130 MMHG | WEIGHT: 168 LBS | BODY MASS INDEX: 32.98 KG/M2 | HEIGHT: 60 IN

## 2018-01-24 NOTE — RESULT NOTES
Verified Results  (1) TISSUE EXAM [id number] 03:05PM Dequan Cronin     Test Name Result Flag Reference   LAB AP CASE REPORT (Report)     Surgical Pathology Report             Case: [id number]                   Authorizing Provider: Zakia Garcia MD    Collected:      01/10/2018 1505        Ordering Location:   [facility]    Received:      01/10/2018 [place] [address] Operating Room                            Pathologist:      Mallory Henriquez MD                                Specimen:  Endometrium, [facility] & Polyp & Fibroid   LAB AP FINAL DIAGNOSIS (Report)     A  Endometrium, [facility] & Polyp & Fibroid, endometrial curettage, polypectomy   and myomectomy (morcellated):  - Multiple fragments of benign endometrial and mixed   endometrial-endocervical polyp(s)  - Few fragments of benign cystic atrophic to weakly proliferative   endometrium with tubal metaplasia    and fragments of lower uterine segment  - Numerous fragments of smooth muscle consistent with leiomyoma and   myometrium  -- No significant cytologic atypia, no increased mitoses (up to 4   mitoses/10 HPF) and      no tumor cell necrosis  -- Foci of acute hemorrhagic infarct-type necrosis  - Few fragments of benign endocervix  - Negative for hyperplasia, malignancy and chronic endometritis  Interpretation performed at -[facility], [address] [place] [address]  Electronically signed by Mallory Henriquez MD on 1/15/2018 at 1:25 PM   LAB AP NOTE      Intradepartmental consultation (DP) agrees with the above diagnosis  LAB AP SURGICAL ADDITIONAL INFORMATION (Report)     All controls performed with the immunohistochemical stains reported above   reacted appropriately  These tests were developed and their performance   characteristics determined by [facility] or   [employer]  They may not be cleared or approved by the [place]  Food and Drug Administration   The FDA has determined that such clearance   or approval is not necessary  These tests are used for clinical purposes  They should not be regarded as investigational or for research  This   laboratory has been approved by [place] [address], designated as a high-complexity   laboratory and is qualified to perform these tests  LAB AP GROSS DESCRIPTION (Report)     A  The specimen is received in formalin, labeled with the patient's name   and hospital number, and is designated [facility] and polyp and fibroid  The   specimen consists of multiple white tan pink to tan red to white tan, to   red tan, soft and polypoid and rubbery tissue fragments measuring in   aggregate 7 4 x 3 9 x 2 8 cm  Entirely submitted  28 cassettes  Note: The estimated total formalin fixation time based upon information   provided by the submitting clinician and the standard processing schedule   is under 72 hours  [facility]   LAB AP CLINICAL INFORMATION      PMB  --Three cm fundal myoma with bands to lateral walls and with widely based attachment anteriorly     --5x12 mm polyp avulsed with dilation of endocervix  --smooth endometrial cavity otherwise

## 2018-02-05 ENCOUNTER — TRANSCRIBE ORDERS (OUTPATIENT)
Dept: ADMINISTRATIVE | Facility: HOSPITAL | Age: 78
End: 2018-02-05

## 2018-02-05 DIAGNOSIS — Z12.39 SCREENING BREAST EXAMINATION: Primary | ICD-10-CM

## 2018-02-05 DIAGNOSIS — N95.1 POST MENOPAUSAL SYNDROME: ICD-10-CM

## 2018-02-13 ENCOUNTER — OFFICE VISIT (OUTPATIENT)
Dept: UROLOGY | Facility: MEDICAL CENTER | Age: 78
End: 2018-02-13
Payer: MEDICARE

## 2018-02-13 VITALS
WEIGHT: 172 LBS | BODY MASS INDEX: 33.77 KG/M2 | HEIGHT: 60 IN | DIASTOLIC BLOOD PRESSURE: 80 MMHG | SYSTOLIC BLOOD PRESSURE: 124 MMHG

## 2018-02-13 DIAGNOSIS — R35.0 FREQUENCY OF URINATION: Primary | ICD-10-CM

## 2018-02-13 LAB
SL AMB  POCT GLUCOSE, UA: NEGATIVE
SL AMB LEUKOCYTE ESTERASE,UA: NEGATIVE
SL AMB POCT BILIRUBIN,UA: NEGATIVE
SL AMB POCT BLOOD,UA: ABNORMAL
SL AMB POCT CLARITY,UA: ABNORMAL
SL AMB POCT COLOR,UA: YELLOW
SL AMB POCT KETONES,UA: NEGATIVE
SL AMB POCT NITRITE,UA: NEGATIVE
SL AMB POCT PH,UA: 7
SL AMB POCT SPECIFIC GRAVITY,UA: 1.01
SL AMB POCT URINE PROTEIN: NEGATIVE
SL AMB POCT UROBILINOGEN: 0.2

## 2018-02-13 PROCEDURE — 99213 OFFICE O/P EST LOW 20 MIN: CPT | Performed by: UROLOGY

## 2018-02-13 PROCEDURE — 81003 URINALYSIS AUTO W/O SCOPE: CPT | Performed by: UROLOGY

## 2018-02-13 NOTE — PROGRESS NOTES
[address] [facility]  [facility]  [address] [facility], [address] [place] [address]  [phone]  www  [facility]  org      NAME: Mikhail Prasad  AGE: 76 y o  SEX: female  : 1940   MRN: [id number]    DATE: 2018  TIME: 11:05 AM    Assessment and Plan; Discussed interstim and botox- not intertested for now- can live with it  Gave her samples of Myrbetriq 50 mg which she can try if she likes she can call in for a prescription   She wishes to continue with maintenance PT NS  Return to office in: 1 year    Chief Complaint   No chief complaint on file  History of Present Illness   Long history of urge incontinence  Has been treated with PTNS in the past This isn't working for her  Urinary frequency of at least 2 hours during the daytime, and this goes on through night  She wears a pad because if she waits too long to void she wets  This happens couple of times per day  We tried VESIcare 1 time this major severely constipated and she has glaucoma  We tried Myrbetriq which did not help either  Right nephrolithiasis, status post right ureteroscopy laser lithotripsy in         The following portions of the patient's history were reviewed and updated as appropriate: allergies, current medications, past family history, past medical history, past social history, past surgical history and problem list     Review of Systems   Review of Systems    Active Problem List     Patient Active Problem List   Diagnosis    Urge incontinence of urine    Endometrial mass    Postmenopausal bleeding       Objective   /80 (BP Location: Left arm, Patient Position: Sitting)   Ht 5' 0 25" (1 53 m)   Wt 78 kg (172 lb)   BMI 33 31 kg/m²     Physical Exam    Pertinent Laboratory/Diagnostic Studies:  CBC: No results found for: WBC, RBC, HGB, HCT, MCV, MCH, MCHC, RDW, MPV, PLT, NRBC, NEUTOPHILPCT, LYMPHOPCT, MONOPCT, EOSPCT, BASOPCT, Lendia Santy, LYMPHSABS, Akua Dale, MONOSABS  Chemistry Profile:   Lab Results   Component Value Date/Time     01/10/2018 11:57 AM    K 3 5 01/10/2018 11:57 AM     01/10/2018 11:57 AM    CO2 27 01/10/2018 11:57 AM    ANIONGAP 8 01/10/2018 11:57 AM    BUN 20 01/10/2018 11:57 AM    CREATININE 0 69 01/10/2018 11:57 AM    GLUCOSE 96 01/10/2018 11:57 AM    SLAMBGLUCOSE negative 02/13/2018 11:02 AM    CALCIUM 9 4 01/10/2018 11:57 AM    EGFR 83 01/10/2018 11:57 AM       Current Medications     Current Outpatient Prescriptions:     aspirin (ECOTRIN LOW STRENGTH) 81 mg EC tablet, Take 81 mg by mouth daily, Disp: , Rfl:     atorvastatin (LIPITOR) 10 mg tablet, Take 10 mg by mouth daily, Disp: , Rfl:     benazepril (LOTENSIN) 10 mg tablet, Take 10 mg by mouth every evening, Disp: , Rfl:     choline fenofibrate (TRILIPIX) 45 MG capsule, Take 45 mg by mouth daily, Disp: , Rfl:     docusate sodium (COLACE) 100 mg capsule, Take 100 mg by mouth every evening, Disp: , Rfl:     latanoprost (XALATAN) 0 005 % ophthalmic solution, Administer 1 drop to both eyes daily at bedtime, Disp: , Rfl:         Paco López MD

## 2018-02-13 NOTE — LETTER
February 13, 2018     Herb Walden, [address]    Patient: Ileana Castellano   YOB: 1940   Date of Visit: 2/13/2018       Dear Dr Ames Deaner: Thank you for referring Barber Child to me for evaluation  Below are my notes for this consultation  If you have questions, please do not hesitate to call me  I look forward to following your patient along with you           Sincerely,        Lizbeth Rangel MD        CC: No Recipients

## 2018-02-15 ENCOUNTER — PROCEDURE VISIT (OUTPATIENT)
Dept: UROLOGY | Facility: MEDICAL CENTER | Age: 78
End: 2018-02-15
Payer: MEDICARE

## 2018-02-15 DIAGNOSIS — R35.0 URINARY FREQUENCY: ICD-10-CM

## 2018-02-15 DIAGNOSIS — N39.41 URGE INCONTINENCE: ICD-10-CM

## 2018-02-15 DIAGNOSIS — R39.15 URGENCY OF URINATION: ICD-10-CM

## 2018-02-15 PROCEDURE — 64566 NEUROELTRD STIM POST TIBIAL: CPT | Performed by: UROLOGY

## 2018-02-15 NOTE — PROGRESS NOTES
Procedure: Percutaneous Tibial Nerve Stimulation (PTNS)   Date onset of symptoms A LONG TIME AGO  Behavior modification: NO  Biofeedback: NO  E-Stim: NO  Drugs: VESICARE GAVE HER SIDE EFFEECTS SHE COULDN'T TOLERATE  Other:  Session number: ONE MONTH F/U     Patient Goals and Progress   Decreased urgency: YES   Decreased frequency: YES  No accidents: YES  Sleeps through the night:  YES  No related health and social factors: NO      Caffeine - cups per day: 0  Alcohol - number of drinks per day: 0   Daytime voids - number per day: 4-5    Nighttime voids - number per night: 1-2  Urgency: 2  Incontinence - episodes per day:      Treatment Plan   Increase fluids: NO   Decrease fluids: YES   Decrease caffeine: NO  Urge reduction techniques: YES  Kegels: NO   Toilet every 3-4 hours     No fluids before bed YES     Ankle Used: LEFT     Treatment settin   Duration of treatment: 30 MINUTES  Lead lot #: [id number]  Expiration Date: 2020       Feeling/Response:  Toe flex and foot sensation

## 2018-02-16 NOTE — PROGRESS NOTES
I have reviewed the notes, assessments, and/or procedures performed by , I concur with her/his documentation of Tanya Dover

## 2018-02-22 ENCOUNTER — HOSPITAL ENCOUNTER (OUTPATIENT)
Dept: MAMMOGRAPHY | Facility: MEDICAL CENTER | Age: 78
Discharge: HOME | End: 2018-02-22
Payer: MEDICARE

## 2018-02-22 ENCOUNTER — HOSPITAL ENCOUNTER (OUTPATIENT)
Dept: BONE DENSITY | Facility: MEDICAL CENTER | Age: 78
Discharge: HOME | End: 2018-02-22
Payer: MEDICARE

## 2018-02-22 DIAGNOSIS — Z12.39 SCREENING BREAST EXAMINATION: ICD-10-CM

## 2018-02-22 DIAGNOSIS — N95.1 POST MENOPAUSAL SYNDROME: ICD-10-CM

## 2018-02-22 PROCEDURE — 77067 SCR MAMMO BI INCL CAD: CPT

## 2018-02-22 PROCEDURE — 77080 DXA BONE DENSITY AXIAL: CPT

## 2018-03-15 ENCOUNTER — PROCEDURE VISIT (OUTPATIENT)
Dept: UROLOGY | Facility: MEDICAL CENTER | Age: 78
End: 2018-03-15
Payer: MEDICARE

## 2018-03-15 DIAGNOSIS — R39.15 URGENCY OF URINATION: ICD-10-CM

## 2018-03-15 DIAGNOSIS — R35.0 URINARY FREQUENCY: ICD-10-CM

## 2018-03-15 DIAGNOSIS — N39.41 URGE INCONTINENCE OF URINE: ICD-10-CM

## 2018-03-15 PROCEDURE — 64566 NEUROELTRD STIM POST TIBIAL: CPT

## 2018-03-15 NOTE — PROGRESS NOTES
Procedure: Percutaneous Tibial Nerve Stimulation (PTNS)   Date onset of symptoms A LONG TIME AGO  Behavior modification: NO  Biofeedback: NO  E-Stim: NO  Drugs: VESICARE GAVE HER SIDE EFFECTS SHE COULDN'T TOLERATE   Other: NONE  Session number: ONE MONTH F/U     Patient Goals and Progress   Decreased urgency: YES  Decreased frequency: YES  No accidents: YES  Sleeps through the night: YES   No related health and social factors: NO     Caffeine - cups per day: 0  Alcohol - number of drinks per day: 0   Daytime voids - number per day: 4-5   Nighttime voids - number per night: 1-2   Urgency: 2   Incontinence - episodes per day: 2     Treatment Plan   Increase fluids: NO   Decrease fluids: YES   Decrease caffeine: NO  Urge reduction techniques: YES  Kegels: NO  Toilet every 3-4 hours     No fluids before bed YES     Ankle Used: LEFT     Treatment settin  Duration of treatment: 30 MINUTES  Lead lot #: [id number]  Expiration Date: 2020       Feeling/Response:  Toe flex and foot sensation

## 2018-04-17 ENCOUNTER — PROCEDURE VISIT (OUTPATIENT)
Dept: UROLOGY | Facility: MEDICAL CENTER | Age: 78
End: 2018-04-17
Payer: MEDICARE

## 2018-04-17 DIAGNOSIS — N39.41 URGE INCONTINENCE: ICD-10-CM

## 2018-04-17 DIAGNOSIS — R35.0 URINARY FREQUENCY: ICD-10-CM

## 2018-04-17 DIAGNOSIS — R39.15 URGENCY OF URINATION: ICD-10-CM

## 2018-04-17 PROCEDURE — 64566 NEUROELTRD STIM POST TIBIAL: CPT

## 2018-05-22 ENCOUNTER — PROCEDURE VISIT (OUTPATIENT)
Dept: UROLOGY | Facility: MEDICAL CENTER | Age: 78
End: 2018-05-22
Payer: MEDICARE

## 2018-05-22 DIAGNOSIS — N39.41 URGE INCONTINENCE: ICD-10-CM

## 2018-05-22 DIAGNOSIS — R35.0 URINARY FREQUENCY: ICD-10-CM

## 2018-05-22 DIAGNOSIS — R39.15 URGENCY OF URINATION: ICD-10-CM

## 2018-05-22 PROCEDURE — 64566 NEUROELTRD STIM POST TIBIAL: CPT

## 2018-05-22 NOTE — PROGRESS NOTES
Procedure: Percutaneous Tibial Nerve Stimulation (PTNS)   Date onset of symptoms SINCE 2-3 YEARS AGO  Behavior modification: NO  Biofeedback: NO  E-Stim: NO  Drugs: VESICARE HAD SIDE EFFECTS SHE COULDN'T TOLERATE   Other: NONE  Session number: ONE MONTH F/U      Patient Goals and Progress   Decreased urgency: YES  Decreased frequency: YES  No accidents: YES   Sleeps through the night: YES   No related health and social factors: NO     Caffeine - cups per day: 0  Alcohol - number of drinks per day: 0   Daytime voids - number per day: 4-5   Nighttime voids - number per night: 1-2   Urgency: 2  Incontinence - episodes per day: 2      Treatment Plan   Increase fluids: NO    Decrease fluids: YES  Decrease caffeine: NO  Urge reduction techniques: YES  Kegels: NO  Toilet every 3-4 hours     No fluids before bed YES     Ankle Used: LEFT     Treatment settin  Duration of treatment: 30 MINUTES  Lead lot #: [id number]  Expiration Date: 10-       Feeling/Response:  Toe flex and foot sensation

## 2018-06-21 ENCOUNTER — PROCEDURE VISIT (OUTPATIENT)
Dept: UROLOGY | Facility: MEDICAL CENTER | Age: 78
End: 2018-06-21
Payer: MEDICARE

## 2018-06-21 DIAGNOSIS — N39.41 URGE INCONTINENCE: ICD-10-CM

## 2018-06-21 DIAGNOSIS — R35.0 URINARY FREQUENCY: ICD-10-CM

## 2018-06-21 DIAGNOSIS — R39.15 URGENCY OF URINATION: ICD-10-CM

## 2018-06-21 PROCEDURE — 64566 NEUROELTRD STIM POST TIBIAL: CPT

## 2018-06-21 NOTE — PROGRESS NOTES
Procedure: Percutaneous Tibial Nerve Stimulation (PTNS)   Date onset of symptoms ABOUT 2-3 YEARS AGO  Behavior modification: NO  Biofeedback: NO   E-Stim: NO  Drugs: VESICARE GAVE HER SIDE EFFECTS SHE COULDN'T TOLERATE   Other: NONE  Session number: ONE MONTH F/U     Patient Goals and Progress   Decreased urgency: YES  Decreased frequency: YES  No accidents: YES  Sleeps through the night: YES  No related health and social factors: NO      Caffeine - cups per day: 0    Alcohol - number of drinks per day: 0   Daytime voids - number per day: 4-5   Nighttime voids - number per night: 1-2  Urgency: 2  Incontinence - episodes per day: 2     Treatment Plan   Increase fluids: NO   Decrease fluids: YES  Decrease caffeine: NO  Urge reduction techniques: YES  Kegels: NO  Toilet every 3-4 hours     No fluids before bed YES     Ankle Used: LEFT     Treatment setting: 10  Duration of treatment: 30 MINUTES  Lead lot #: [id number]  Expiration Date: 10-       Feeling/Response:  Toe flex and foot sensation

## 2018-06-21 NOTE — PROGRESS NOTES
I have reviewed the notes, assessments, and/or procedures performed, I concur with her/his documentation of Shantanu Craig

## 2018-07-23 ENCOUNTER — TELEPHONE (OUTPATIENT)
Dept: UROLOGY | Facility: MEDICAL CENTER | Age: 78
End: 2018-07-23

## 2018-08-07 ENCOUNTER — PROCEDURE VISIT (OUTPATIENT)
Dept: UROLOGY | Facility: MEDICAL CENTER | Age: 78
End: 2018-08-07
Payer: MEDICARE

## 2018-08-07 DIAGNOSIS — R39.15 URGENCY OF URINATION: ICD-10-CM

## 2018-08-07 DIAGNOSIS — R35.0 URINARY FREQUENCY: ICD-10-CM

## 2018-08-07 DIAGNOSIS — N39.41 URGE INCONTINENCE OF URINE: ICD-10-CM

## 2018-08-07 PROCEDURE — 64566 NEUROELTRD STIM POST TIBIAL: CPT

## 2018-08-07 NOTE — PROGRESS NOTES
Procedure: Percutaneous Tibial Nerve Stimulation (PTNS)   Date onset of symptoms ABOUT 2-3 AGO  Behavior modification: NO  Biofeedback: NO  E-Stim: NO  Drugs: VESICARE HAD SIDE EFFECTS SHE COULDN'T TOLERATE   Other: NONE  Session number: ONE MONTH F/U     Patient Goals and Progress   Decreased urgency: YES  Decreased frequency: YES  No accidents: YES  Sleeps through the night: YES  No related health and social factors: NO     Caffeine - cups per day: 0  Alcohol - number of drinks per day: 0   Daytime voids - number per day: 4-5   Nighttime voids - number per night: 2  Urgency: 2  Incontinence - episodes per day: 2     Treatment Plan   Increase fluids: NO   Decrease fluids: YES   Decrease caffeine: NO  Urge reduction techniques: YES  Kegels: NO   Toilet every 3-4 hours     No fluids before bed YES     Ankle Used: LEFT     Treatment settin  Duration of treatment: 30 MINUTES  Lead lot #: [id number]  Expiration Date: 10-       Feeling/Response:  Toe flex and foot sensation

## 2018-09-13 ENCOUNTER — PROCEDURE VISIT (OUTPATIENT)
Dept: UROLOGY | Facility: MEDICAL CENTER | Age: 78
End: 2018-09-13
Payer: MEDICARE

## 2018-09-13 DIAGNOSIS — R39.15 URGENCY OF URINATION: ICD-10-CM

## 2018-09-13 DIAGNOSIS — N39.41 URGE INCONTINENCE: ICD-10-CM

## 2018-09-13 DIAGNOSIS — R35.0 URINARY FREQUENCY: ICD-10-CM

## 2018-09-13 PROCEDURE — 64566 NEUROELTRD STIM POST TIBIAL: CPT

## 2018-09-13 NOTE — PROGRESS NOTES
I have reviewed the notes, assessments, and/or procedures performed , I concur with her/his documentation of Cinthya Arteaga

## 2018-09-13 NOTE — PROGRESS NOTES
Procedure: Percutaneous Tibial Nerve Stimulation (PTNS)   Date onset of symptoms A LONG TIME AGO  Behavior modification: NO  Biofeedback: NO  E-Stim: NO   Drugs: VESICARE HAD SIDE EFFECTS SHE COULDN'T TOLERATE    Other: NONE  Session number: ONE MONTH F/U     Patient Goals and Progress   Decreased urgency: YES  Decreased frequency: YES  No accidents: YES   Sleeps through the night: YES  No related health and social factors: NO      Caffeine - cups per day: 0  Alcohol - number of drinks per day: 0   Daytime voids - number per day: Q 2 HOURS   Nighttime voids - number per night: Q 2 HOURS  Urgency: 2  Incontinence - episodes per day: 2     Treatment Plan   Increase fluids: NO   Decrease fluids: YES   Decrease caffeine: NO  Urge reduction techniques: YES  Kegels: NO    Toilet every 3-4 hours     No fluids before bed YES     Ankle Used: RIGHT     Treatment setting: 3  Duration of treatment: 30 MINUTES  Lead lot #: [id number]  Expiration Date: 10-       Feeling/Response:  Toe flex and foot sensation

## 2018-10-11 ENCOUNTER — PROCEDURE VISIT (OUTPATIENT)
Dept: UROLOGY | Facility: MEDICAL CENTER | Age: 78
End: 2018-10-11
Payer: MEDICARE

## 2018-10-11 DIAGNOSIS — R35.0 URINARY FREQUENCY: ICD-10-CM

## 2018-10-11 DIAGNOSIS — N39.41 URGE INCONTINENCE OF URINE: ICD-10-CM

## 2018-10-11 DIAGNOSIS — R39.15 URGENCY OF URINATION: ICD-10-CM

## 2018-10-11 PROCEDURE — 64566 NEUROELTRD STIM POST TIBIAL: CPT

## 2018-10-11 NOTE — PROGRESS NOTES
I have reviewed the notes, assessments, and/or procedures performed , I concur with her/his documentation of Dago Jerry

## 2018-10-11 NOTE — PROGRESS NOTES
Procedure: Percutaneous Tibial Nerve Stimulation (PTNS)       Date onset of symptoms ABOUT 2-3 YEARS AGO  Behavior modification: NO  Biofeedback: NO  E-Stim: NO  Drugs: VESICARE HAD SIDE EFFECTS SHE COULDN'T TOLERATE  Other: NONE  Session number: ONE MONTH F/U     Patient Goals and Progress   Decreased urgency: YES  Decreased frequency: YES  No accidents: YES    Sleeps through the night: YES  No related health and social factors: NO     Caffeine - cups per day: 0  Alcohol - number of drinks per day: 0  Daytime voids - number per day: 4-5   Nighttime voids - number per night: 1-2  Urgency: 2       Incontinence - episodes per day: 2     Treatment Plan   Increase fluids: NO  Decrease fluids: YES    Decrease caffeine: NO  Urge reduction techniques: YES  Kegels: NO   Toilet every 3-4 hours     No fluids before bed YES     Ankle Used: LEFT     Treatment settin  Duration of treatment: 30 MINUTES  Lead lot #: [id number]  Expiration Date: 10-       Feeling/Response:  Toe flex and foot sensation

## 2018-11-19 ENCOUNTER — TELEPHONE (OUTPATIENT)
Dept: UROLOGY | Facility: MEDICAL CENTER | Age: 78
End: 2018-11-19

## 2018-11-19 NOTE — TELEPHONE ENCOUNTER
Patient would like a call back to reschedule her PTNS visit  She had to cancel due to her  being hospitalized

## 2018-12-06 ENCOUNTER — PROCEDURE VISIT (OUTPATIENT)
Dept: UROLOGY | Facility: MEDICAL CENTER | Age: 78
End: 2018-12-06
Payer: MEDICARE

## 2018-12-06 DIAGNOSIS — R39.15 URGENCY OF URINATION: ICD-10-CM

## 2018-12-06 DIAGNOSIS — N39.41 URGE INCONTINENCE OF URINE: ICD-10-CM

## 2018-12-06 DIAGNOSIS — R35.0 URINARY FREQUENCY: ICD-10-CM

## 2018-12-06 PROCEDURE — 64566 NEUROELTRD STIM POST TIBIAL: CPT

## 2018-12-06 NOTE — PROGRESS NOTES
Procedure: Percutaneous Tibial Nerve Stimulation (PTNS)       Date onset of symptoms ABOUT 2-3 YEARS AGO  Behavior modification: NO  Biofeedback: NO  E-Stim: NO  Drugs: VESICARE HAD SIDE EFFECTS SHE COULDN'T TOLERATE  Other: NONE  Session number: ONE MONTH F/U     Patient Goals and Progress   Decreased urgency: YES  Decreased frequency: YES  No accidents: YES    Sleeps through the night: YES  No related health and social factors: NO     Caffeine - cups per day: 0  Alcohol - number of drinks per day: 0  Daytime voids - number per day: 4-5    Nighttime voids - number per night: 1-2  Urgency: 2      Incontinence - episodes per day: 2     Treatment Plan   Increase fluids: NO  Decrease fluids: YES    Decrease caffeine: NO  Urge reduction techniques: YES  Kegels: NO   Toilet every 3-4 hours     No fluids before bed YES     Ankle Used: LEFT     Treatment settin  Duration of treatment: 30 MINUTES  Lead lot #: [id number]  Expiration Date: 2021       Feeling/Response:  Toe flex and foot sensation

## 2019-01-03 ENCOUNTER — PROCEDURE VISIT (OUTPATIENT)
Dept: UROLOGY | Facility: MEDICAL CENTER | Age: 79
End: 2019-01-03
Payer: MEDICARE

## 2019-01-03 DIAGNOSIS — R39.15 URGENCY OF URINATION: ICD-10-CM

## 2019-01-03 DIAGNOSIS — N39.41 URGE INCONTINENCE OF URINE: ICD-10-CM

## 2019-01-03 DIAGNOSIS — R35.0 URINARY FREQUENCY: ICD-10-CM

## 2019-01-03 PROCEDURE — 64566 NEUROELTRD STIM POST TIBIAL: CPT

## 2019-01-03 NOTE — PROGRESS NOTES
Procedure: Percutaneous Tibial Nerve Stimulation (PTNS)       Date onset of symptoms ABOUT 2-3 YEARS AGO  Behavior modification: NO  Biofeedback: NO  E-Stim: NO  Drugs: VESICARE HAD SIDE EFFECTS SHE COULDN'T TOLERATE  Other: NONE  Session number: ONE MONTH F/U     Patient Goals and Progress   Decreased urgency: YES  Decreased frequency: YES  No accidents: YES    Sleeps through the night: YES  No related health and social factors: NO     Caffeine - cups per day: 0  Alcohol - number of drinks per day: 0    Daytime voids - number per day: 4-5  Nighttime voids - number per night: 1-2  Urgency: 2      Incontinence - episodes per day: 2      Treatment Plan   Increase fluids: NO  Decrease fluids: YES    Decrease caffeine: NO  Urge reduction techniques: YES  Kegels: NO   Toilet every 3-4 hours     No fluids before bed YES     Ankle Used: LEFT     Treatment settin   Duration of treatment: 30 MINUTES  Lead lot #: [id number]  Expiration Date: 2021       Feeling/Response:  Toe flex and foot sensation

## 2019-02-19 ENCOUNTER — OFFICE VISIT (OUTPATIENT)
Dept: UROLOGY | Facility: MEDICAL CENTER | Age: 79
End: 2019-02-19
Payer: MEDICARE

## 2019-02-19 VITALS
WEIGHT: 169 LBS | HEART RATE: 84 BPM | SYSTOLIC BLOOD PRESSURE: 140 MMHG | DIASTOLIC BLOOD PRESSURE: 80 MMHG | BODY MASS INDEX: 33.18 KG/M2 | HEIGHT: 60 IN

## 2019-02-19 DIAGNOSIS — N39.41 URGE INCONTINENCE OF URINE: Primary | ICD-10-CM

## 2019-02-19 LAB
SL AMB  POCT GLUCOSE, UA: ABNORMAL
SL AMB LEUKOCYTE ESTERASE,UA: ABNORMAL
SL AMB POCT BILIRUBIN,UA: ABNORMAL
SL AMB POCT BLOOD,UA: ABNORMAL
SL AMB POCT CLARITY,UA: CLEAR
SL AMB POCT COLOR,UA: YELLOW
SL AMB POCT KETONES,UA: ABNORMAL
SL AMB POCT NITRITE,UA: ABNORMAL
SL AMB POCT PH,UA: 6.5
SL AMB POCT SPECIFIC GRAVITY,UA: 1.02
SL AMB POCT URINE PROTEIN: ABNORMAL
SL AMB POCT UROBILINOGEN: 0.2

## 2019-02-19 PROCEDURE — 99213 OFFICE O/P EST LOW 20 MIN: CPT | Performed by: UROLOGY

## 2019-02-19 PROCEDURE — 81003 URINALYSIS AUTO W/O SCOPE: CPT | Performed by: UROLOGY

## 2019-02-19 RX ORDER — CHLORHEXIDINE GLUCONATE 0.12 MG/ML
RINSE ORAL
Refills: 0 | COMMUNITY
Start: 2019-01-17 | End: 2019-08-05 | Stop reason: ALTCHOICE

## 2019-02-19 RX ORDER — MELATONIN
1000 DAILY
COMMUNITY

## 2019-02-19 RX ORDER — TIMOLOL MALEATE 5 MG/ML
SOLUTION/ DROPS OPHTHALMIC
Refills: 1 | COMMUNITY
Start: 2019-01-15 | End: 2021-07-08

## 2019-02-19 NOTE — PROGRESS NOTES
[address] [facility]  [facility]  [address] [facility], [address] [place] [address]  [phone]  www  [facility]  org      NAME: Will Chen  AGE: [de-identified] y o  SEX: female  : 1940   MRN: [id number]    DATE: 2019  TIME: 8:30 AM    Assessment and Plan:    Urge incontinence, doing very well  I do not think she needs to continue with PT NS as her last treatment was 4 months ago  She has very minimal symptoms and I will see her back as needed should she relapse  Chief Complaint   No chief complaint on file  History of Present Illness   Urge incontinence:  Continues with PT NS maintenance  Last treatment was in 2018  Nocturia only 1-2 times per night  During the daytime she has urgency and frequency, and she only has urge incontinence if she waits too long to go to the bathroom  She is on no overactive bladder medication  She has failed VESIcare and Myrbetriq  She has glaucoma  Urinalysis is negative  She was actually able to go on a 7 hr drive and only stop twice recently  The following portions of the patient's history were reviewed and updated as appropriate: allergies, current medications, past family history, past medical history, past social history, past surgical history and problem list     Review of Systems   Review of Systems   Genitourinary: Positive for frequency and urgency  Active Problem List     Patient Active Problem List   Diagnosis    Urge incontinence of urine    Endometrial mass    Postmenopausal bleeding       Objective   There were no vitals taken for this visit  Physical Exam   Constitutional: She is oriented to person, place, and time  She appears well-developed and well-nourished  HENT:   Head: Normocephalic and atraumatic  Eyes: EOM are normal    Neck: Normal range of motion  Pulmonary/Chest: Effort normal    Neurological: She is alert and oriented to person, place, and time     Skin: Skin is warm and dry  Psychiatric: She has a normal mood and affect   Her behavior is normal  Judgment and thought content normal            Current Medications     Current Outpatient Medications:     aspirin (ECOTRIN LOW STRENGTH) 81 mg EC tablet, Take 81 mg by mouth daily, Disp: , Rfl:     atorvastatin (LIPITOR) 10 mg tablet, Take 10 mg by mouth daily, Disp: , Rfl:     benazepril (LOTENSIN) 10 mg tablet, Take 10 mg by mouth every evening, Disp: , Rfl:     chlorhexidine (PERIDEX) 0 12 % solution, RINSE WITH 1/2 OZ FOR 30 SECONDS AND SPIT TWICE A DAY, Disp: , Rfl: 0    choline fenofibrate (TRILIPIX) 45 MG capsule, Take 45 mg by mouth daily, Disp: , Rfl:     docusate sodium (COLACE) 100 mg capsule, Take 100 mg by mouth every evening, Disp: , Rfl:     latanoprost (XALATAN) 0 005 % ophthalmic solution, Administer 1 drop to both eyes daily at bedtime, Disp: , Rfl:     timolol (TIMOPTIC) 0 5 % ophthalmic solution, TAKE 1 DROP INTO LEFT EYE IN THE MORNING, Disp: , Rfl: 1        Herlinda Kamara MD

## 2019-07-30 ENCOUNTER — TRANSCRIBE ORDERS (OUTPATIENT)
Dept: ADMINISTRATIVE | Facility: HOSPITAL | Age: 79
End: 2019-07-30

## 2019-07-30 DIAGNOSIS — Z12.39 BREAST SCREENING, UNSPECIFIED: Primary | ICD-10-CM

## 2019-08-05 ENCOUNTER — OFFICE VISIT (OUTPATIENT)
Dept: UROLOGY | Facility: MEDICAL CENTER | Age: 79
End: 2019-08-05
Payer: MEDICARE

## 2019-08-05 VITALS
BODY MASS INDEX: 32 KG/M2 | WEIGHT: 163 LBS | HEIGHT: 60 IN | DIASTOLIC BLOOD PRESSURE: 70 MMHG | HEART RATE: 82 BPM | SYSTOLIC BLOOD PRESSURE: 118 MMHG

## 2019-08-05 DIAGNOSIS — N39.41 URGE INCONTINENCE OF URINE: Primary | ICD-10-CM

## 2019-08-05 LAB
SL AMB  POCT GLUCOSE, UA: ABNORMAL
SL AMB LEUKOCYTE ESTERASE,UA: ABNORMAL
SL AMB POCT BILIRUBIN,UA: ABNORMAL
SL AMB POCT BLOOD,UA: ABNORMAL
SL AMB POCT CLARITY,UA: CLEAR
SL AMB POCT COLOR,UA: YELLOW
SL AMB POCT KETONES,UA: ABNORMAL
SL AMB POCT NITRITE,UA: ABNORMAL
SL AMB POCT PH,UA: 6
SL AMB POCT SPECIFIC GRAVITY,UA: 1.02
SL AMB POCT URINE PROTEIN: ABNORMAL
SL AMB POCT UROBILINOGEN: 0.2

## 2019-08-05 PROCEDURE — 81003 URINALYSIS AUTO W/O SCOPE: CPT | Performed by: UROLOGY

## 2019-08-05 PROCEDURE — 99213 OFFICE O/P EST LOW 20 MIN: CPT | Performed by: UROLOGY

## 2019-08-05 RX ORDER — KETOCONAZOLE 20 MG/G
CREAM TOPICAL
Refills: 0 | COMMUNITY
Start: 2019-07-30 | End: 2021-07-08

## 2019-08-05 NOTE — PROGRESS NOTES
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  This does not need to be imaged every year  She will most likely need some form of abdominal imaging in the future for other reasons so I am not going to schedule a renal ultrasound or CT scan  Urge incontinence, nocturia urgency and frequency:-this is become a little worse and her last PTNS was 2018, however she has a lot of things on her plate right now and she will call me if she has any issues that she wishes to be addressed in a more aggressive manner  Chief Complaint   No chief complaint on file  History of Present Illness   Urge incontinence:  Has previously been on PT NS and her last treatment was 2018  Her symptoms are about the same maybe a little bit worse  However she has been very busy with her  and taking care of him and has not had time to make doctors appointments or pay much attention to her own matters  Dr Kareen Reyes wants her to be evaluated for her angiomyolipoma  She has a known 2 2 cm right renal angiomyolipoma which has not changed as of May 2019  This was seen on most recent abdominal ultrasound  We have imaging dating back from  and before showing stability  Urinalysis is negative today except for trace blood, which she always has  She has nocturia 3 times a night  And she voids a lot during the day with frequency  No longer on Myrbetriq      The following portions of the patient's history were reviewed and updated as appropriate: allergies, current medications, past family history, past medical history, past social history, past surgical history and problem list   Past Medical History:   Diagnosis Date    Basal cell carcinoma     chest, thighs    Cancer of the skin, basal cell     Chest and thighs    Constipation     Cystitis     Endometrial mass 11/2017    Endometrial polyp     Last assessed 01/18/18    Fibroids, intramural     2002 and 2008-3 cm fundal with smaller ones   Uterus 9x3x5 cm    Frequency of urination     Glaucoma     b/l    Hematuria, microscopic     Hypercholesteremia     Hypertension     Kidney stone     Macular degeneration     b/l    Morbid obesity ([address] )     Nephrolithiasis     right- in past    Nocturia     OAB (overactive bladder)     PMB (postmenopausal bleeding)     Post-menopausal bleeding 11/2017    Renal mass     Right nephrolithiasis     10/15:5mm sone at right UPJ on CT (prominent uterus, f/u GYN)    Submucous leiomyoma of uterus     Last assessed 01/18/18    Ureteral calculi     Ureteral calculi     Urge incontinence of urine     URI (upper respiratory infection)     resolving, Dr Mina Fess aware    Uterine fibroid     UTI (urinary tract infection)     Varicella without complication     Wears glasses      Past Surgical History:   Procedure Laterality Date    COLONOSCOPY      CYSTOSCOPY  10/2015    For destruction of right ureteral stone (Ca oxal)    CYSTOSCOPY W/ LASER LITHOTRIPSY Right     CYSTOSCOPY W/ URETERAL STENT PLACEMENT Right     CYSTOSCOPY W/ URETERAL STENT REMOVAL Right     10/15    HYSTEROSCOPY  01/2018    w/removal of submucous leiomyomata- benign endometrium,polyps and myoma    MALIGNANT SKIN LESION EXCISION      BCCA chest and thighs    MYOMECTOMY N/A 1/10/2018    Procedure: HYSTEROSCOPY, D&C, HYSTEROSCOPIC MYOMECTOMY;  Surgeon: Gi Gamez MD;  Location: [facility];  Service: Gynecology    OTHER SURGICAL HISTORY  10/2015    Transurethral removal of internally dwelling urteral stent    SKIN LESION EXCISION      Removal- skin back and thighs     shoulder  Review of Systems   Review of Systems Genitourinary: Positive for frequency  Active Problem List     Patient Active Problem List   Diagnosis    Urge incontinence of urine    Endometrial mass    Postmenopausal bleeding       Objective   There were no vitals taken for this visit  Physical Exam   Constitutional: She is oriented to person, place, and time  She appears well-developed and well-nourished  HENT:   Head: Normocephalic and atraumatic  Eyes: EOM are normal    Neck: Normal range of motion  Pulmonary/Chest: Effort normal    Musculoskeletal: Normal range of motion  Neurological: She is alert and oriented to person, place, and time  Skin: Skin is warm and dry  Psychiatric: She has a normal mood and affect   Her behavior is normal  Judgment and thought content normal            Current Medications     Current Outpatient Medications:     aspirin (ECOTRIN LOW STRENGTH) 81 mg EC tablet, Take 81 mg by mouth daily, Disp: , Rfl:     atorvastatin (LIPITOR) 10 mg tablet, Take 10 mg by mouth daily, Disp: , Rfl:     benazepril (LOTENSIN) 10 mg tablet, Take 10 mg by mouth every evening, Disp: , Rfl:     chlorhexidine (PERIDEX) 0 12 % solution, RINSE WITH 1/2 OZ FOR 30 SECONDS AND SPIT TWICE A DAY, Disp: , Rfl: 0    cholecalciferol (VITAMIN D3) 1,000 units tablet, Take 1,000 Units by mouth daily, Disp: , Rfl:     choline fenofibrate (TRILIPIX) 45 MG capsule, Take 45 mg by mouth daily, Disp: , Rfl:     docusate sodium (COLACE) 100 mg capsule, Take 100 mg by mouth every evening, Disp: , Rfl:     latanoprost (XALATAN) 0 005 % ophthalmic solution, Administer 1 drop to both eyes daily at bedtime, Disp: , Rfl:     timolol (TIMOPTIC) 0 5 % ophthalmic solution, TAKE 1 DROP INTO LEFT EYE IN THE MORNING, Disp: , Rfl: 1        Richie Vela MD

## 2019-09-11 ENCOUNTER — HOSPITAL ENCOUNTER (OUTPATIENT)
Dept: MAMMOGRAPHY | Facility: MEDICAL CENTER | Age: 79
Discharge: HOME | End: 2019-09-11
Payer: MEDICARE

## 2019-09-11 VITALS — HEIGHT: 60 IN | BODY MASS INDEX: 32 KG/M2 | WEIGHT: 163 LBS

## 2019-09-11 DIAGNOSIS — Z12.39 BREAST SCREENING, UNSPECIFIED: ICD-10-CM

## 2019-09-11 PROCEDURE — 77067 SCR MAMMO BI INCL CAD: CPT

## 2020-02-12 ENCOUNTER — TRANSCRIBE ORDERS (OUTPATIENT)
Dept: ADMINISTRATIVE | Facility: HOSPITAL | Age: 80
End: 2020-02-12

## 2020-02-12 DIAGNOSIS — M85.80 OSTEOPENIA, UNSPECIFIED LOCATION: Primary | ICD-10-CM

## 2020-06-08 ENCOUNTER — TRANSCRIBE ORDERS (OUTPATIENT)
Dept: ADMINISTRATIVE | Facility: HOSPITAL | Age: 80
End: 2020-06-08

## 2020-06-08 ENCOUNTER — HOSPITAL ENCOUNTER (OUTPATIENT)
Dept: BONE DENSITY | Facility: MEDICAL CENTER | Age: 80
Discharge: HOME | End: 2020-06-08
Payer: MEDICARE

## 2020-06-08 DIAGNOSIS — M85.80 OSTEOPENIA, UNSPECIFIED LOCATION: ICD-10-CM

## 2020-06-08 DIAGNOSIS — Z12.31 VISIT FOR SCREENING MAMMOGRAM: Primary | ICD-10-CM

## 2020-06-08 PROCEDURE — 77080 DXA BONE DENSITY AXIAL: CPT

## 2021-06-18 ENCOUNTER — OFFICE VISIT (OUTPATIENT)
Dept: OBGYN CLINIC | Facility: CLINIC | Age: 81
End: 2021-06-18
Payer: MEDICARE

## 2021-06-18 VITALS
DIASTOLIC BLOOD PRESSURE: 80 MMHG | HEIGHT: 60 IN | SYSTOLIC BLOOD PRESSURE: 138 MMHG | WEIGHT: 163.4 LBS | HEART RATE: 78 BPM | BODY MASS INDEX: 32.08 KG/M2

## 2021-06-18 DIAGNOSIS — N95.0 POSTMENOPAUSAL BLEEDING: Primary | ICD-10-CM

## 2021-06-18 PROCEDURE — 99203 OFFICE O/P NEW LOW 30 MIN: CPT | Performed by: NURSE PRACTITIONER

## 2021-06-18 RX ORDER — LEVOTHYROXINE SODIUM 0.03 MG/1
25 TABLET ORAL DAILY
COMMUNITY

## 2021-06-18 RX ORDER — TRAVOPROST OPHTHALMIC SOLUTION 0.04 MG/ML
1 SOLUTION OPHTHALMIC
COMMUNITY

## 2021-06-18 RX ORDER — ATORVASTATIN CALCIUM 20 MG/1
20 TABLET, FILM COATED ORAL DAILY
COMMUNITY
Start: 2021-05-15

## 2021-06-18 RX ORDER — DORZOLAMIDE HYDROCHLORIDE AND TIMOLOL MALEATE 20; 5 MG/ML; MG/ML
1 SOLUTION/ DROPS OPHTHALMIC 2 TIMES DAILY
COMMUNITY

## 2021-06-18 NOTE — PROGRESS NOTES
Assessment/Plan:    1  Postmenopausal bleeding  Discussed findings of exam with patient  Explained that we will check a pelvic ultrasound first and depending upon findings will have her either book a preoperative visit with Dr Robbi Chauhan for hyst/D&C or have her RTO for endometrial sampling  Explained nature of PMB and need to r/o EMH/atypia or malignancy  Explained that her h/o polyps 3+ years ago is a positive in that they may have grown back and could be the cause of the bleeding  Her questions were answered  Had her book a preop visit with Dr Robbi Chauhan to hold an appt-- may cancel if US findings do not show potential polyps  - US pelvis complete w transvaginal; Future      Subjective:      Patient ID: Karol Chase is a 78 y o  female  HPI  NEW PATIENT PROBLEM    77 yo , NEW/ former patient of Dr Tierra Moncada  She presents for evaluation of postmenopausal vaginal bleeding  Obvious bleeding started about one week ago, red and moderate in amount  Notices more volume of blood when she gets up in the middle of the night to urinate  During the day lightens/ resolves  Uses pads for urinary incontinence and noticed a tannish discharge prior to the red bleeding  She has a history of PMB in  when she last saw Dr Bari Nicholas  Dec 2017/2018 for PMB: ebx and US benign EM, filling defects  1/10/18 hysteroscopy myomectomy, path: benign polyps, EM and fragments of ut myomas     pap negative  fam hx colon cancer in mother and father    Past Medical History:   Diagnosis Date    Basal cell carcinoma     chest, thighs    Cancer of the skin, basal cell     Chest and thighs    Constipation     Cystitis     Endometrial mass 2017    Endometrial polyp     Last assessed 18    Fibroids, intramural      and -3 cm fundal with smaller ones   Uterus 9x3x5 cm    Frequency of urination     Glaucoma     b/l    Hematuria, microscopic     Hypercholesteremia     Hypertension     Kidney stone     Macular degeneration     b/l    Morbid obesity ([address] )     Nephrolithiasis     right- in past    Nocturia     OAB (overactive bladder)     PMB (postmenopausal bleeding)     Post-menopausal bleeding 11/2017    Renal mass     Right nephrolithiasis     10/15:5mm sone at right UPJ on CT (prominent uterus, f/u GYN)    Submucous leiomyoma of uterus     Last assessed 01/18/18    Ureteral calculi     Ureteral calculi     Urge incontinence of urine     URI (upper respiratory infection)     resolving, Dr Baldwin Hanna aware    Uterine fibroid     UTI (urinary tract infection)     Varicella without complication     Wears glasses      Past Surgical History:   Procedure Laterality Date    COLONOSCOPY      CYSTOSCOPY  10/2015    For destruction of right ureteral stone (Ca oxal)    CYSTOSCOPY W/ LASER LITHOTRIPSY Right     CYSTOSCOPY W/ URETERAL STENT PLACEMENT Right     CYSTOSCOPY W/ URETERAL STENT REMOVAL Right     10/15    EYE SURGERY      macular pucker    HYSTEROSCOPY  01/2018    w/removal of submucous leiomyomata- benign endometrium,polyps and myoma    MALIGNANT SKIN LESION EXCISION      BCCA chest and thighs    MYOMECTOMY N/A 1/10/2018    Procedure: HYSTEROSCOPY, D&C, HYSTEROSCOPIC MYOMECTOMY;  Dr Sonia Adrian, path: benign    OTHER SURGICAL HISTORY  10/2015    Transurethral removal of internally dwelling urteral stent    SKIN LESION EXCISION      Removal- skin back and thighs     Current Outpatient Medications on File Prior to Visit   Medication Sig Dispense Refill    atorvastatin (LIPITOR) 20 mg tablet Take 20 mg by mouth daily      benazepril (LOTENSIN) 10 mg tablet Take 10 mg by mouth every evening      cholecalciferol (VITAMIN D3) 1,000 units tablet Take 1,000 Units by mouth daily      dorzolamide-timolol (COSOPT) 22 3-6 8 MG/ML ophthalmic solution 1 drop 2 (two) times a day      levothyroxine 25 mcg tablet Take 25 mcg by mouth daily      travoprost (TRAVATAN-Z) 0 004 % ophthalmic solution 1 drop daily at bedtime      atorvastatin (LIPITOR) 10 mg tablet Take 10 mg by mouth daily (Patient not taking: Reported on 6/18/2021)      choline fenofibrate (TRILIPIX) 45 MG capsule Take 45 mg by mouth daily (Patient not taking: Reported on 6/18/2021)      ketoconazole (NIZORAL) 2 % cream APPLY TO AFFECTED AREA EVERY DAY (Patient not taking: Reported on 6/18/2021)  0    latanoprost (XALATAN) 0 005 % ophthalmic solution Administer 1 drop to both eyes daily at bedtime (Patient not taking: Reported on 6/18/2021)      timolol (TIMOPTIC) 0 5 % ophthalmic solution TAKE 1 DROP INTO LEFT EYE IN THE MORNING (Patient not taking: Reported on 6/18/2021)  1     No current facility-administered medications on file prior to visit  Family History   Problem Relation Age of Onset    Alzheimer's disease Mother     Heart disease Mother     Colon cancer Mother 61    Rectal cancer Father     Colon cancer Father     No Known Problems Maternal Grandmother     No Known Problems Maternal Grandfather     No Known Problems Paternal Grandmother     No Known Problems Paternal Grandfather     Cancer Maternal Aunt     Cancer Maternal Aunt     Diabetes Brother     No Known Problems Son        Substance use: none    The following portions of the patient's history were reviewed and updated as appropriate: allergies, current medications, past family history, past medical history, past social history, past surgical history and problem list     Review of Systems   Constitutional: Negative for chills and fever  Respiratory: Negative for cough and shortness of breath  Musculoskeletal: Negative for arthralgias and myalgias  Objective:      /80 (BP Location: Left arm, Patient Position: Sitting, Cuff Size: Standard)   Pulse 78   Ht 5' (1 524 m)   Wt 74 1 kg (163 lb 6 4 oz)   BMI 31 91 kg/m²          Physical Exam  Constitutional:       General: She is not in acute distress       Appearance: Normal appearance  She is well-developed  She is not ill-appearing or diaphoretic  Comments: BMI 31 9   Eyes:      Pupils: Pupils are equal, round, and reactive to light  Pulmonary:      Effort: Pulmonary effort is normal    Abdominal:      General: There is distension  Palpations: Abdomen is soft  There is no mass  Tenderness: There is no abdominal tenderness  There is no guarding or rebound  Hernia: No hernia is present  Genitourinary:     Exam position: Lithotomy position  Labia:         Right: No rash, tenderness, lesion or injury  Left: No rash, tenderness, lesion or injury  Vagina: No signs of injury and foreign body  Bleeding (scant, menstrual) present  No vaginal discharge, erythema or tenderness  Cervix: No cervical motion tenderness, discharge or friability  Uterus: Not enlarged and not tender  Adnexa:         Right: No mass or tenderness  Left: No mass or tenderness  Comments: Exam somewhat limited by abdominal habitus  Skin:     General: Skin is warm and dry  Neurological:      General: No focal deficit present  Mental Status: She is alert and oriented to person, place, and time  Psychiatric:         Mood and Affect: Mood normal          Behavior: Behavior normal          Thought Content:  Thought content normal          Judgment: Judgment normal

## 2021-06-22 ENCOUNTER — TELEPHONE (OUTPATIENT)
Dept: OBGYN CLINIC | Facility: CLINIC | Age: 81
End: 2021-06-22

## 2021-06-23 ENCOUNTER — TELEPHONE (OUTPATIENT)
Dept: OBGYN CLINIC | Facility: CLINIC | Age: 81
End: 2021-06-23

## 2021-06-23 NOTE — TELEPHONE ENCOUNTER
Called pt to see if 7/8/21 @ 7:30 would work for her  She is able to do that day and time  She is aware it is pending and we will call her to confirm

## 2021-06-25 ENCOUNTER — HOSPITAL ENCOUNTER (OUTPATIENT)
Dept: ULTRASOUND IMAGING | Facility: MEDICAL CENTER | Age: 81
Discharge: HOME | End: 2021-06-25
Payer: MEDICARE

## 2021-06-25 DIAGNOSIS — N95.0 POSTMENOPAUSAL BLEEDING: ICD-10-CM

## 2021-06-25 PROCEDURE — 76856 US EXAM PELVIC COMPLETE: CPT

## 2021-06-25 PROCEDURE — 76830 TRANSVAGINAL US NON-OB: CPT

## 2021-06-25 NOTE — LETTER
[facility]  [address] [facility] [facility]      June 30, 2021    MRN: [id number]     Phone: [phone]     Dear Ms  Jyoti Ventura recently had a(n) Ultrasound performed on 6/25/2021 at  [facility] that was requested by Aneta Jacob  The study was reviewed by a radiologist, which is a physician who specializes in medical imaging  The radiologist issued a report describing his or her findings  In that report there was a finding that the radiologist felt warranted further discussion with your health care provider and that discussion would be beneficial to you  The results were sent to LETICIA Jacob on 06/28/2021  9:02 AM  We recommend that you contact Abi Myrick at [phone] or set up an appointment to discuss the results of the imaging test  If you have already heard from Aneta Jacob regarding the results of your study, you can disregard this letter  This letter is not meant to alarm you, but intended to encourage you to follow-up on your results with the provider that sent you for the imaging study  In addition, we have enclosed answers to frequently asked questions by other patients who have also received a letter to review results with their health care provider (see page two)  Thank you for choosing [facility] for your medical imaging needs  FREQUENTLY ASKED QUESTIONS    1  Why am I receiving this letter? [address] [place] [address] requires us to notify patients who have findings on imaging exams that may require more testing or follow-up with a health professional within the next 3 months          2  How serious is the finding on the imaging test?  This letter is sent to all patients who may need follow-up or more testing within the next 3 months  Receiving this letter does not necessarily mean you have a life-threatening imaging finding or disease  Recommendations in the radiologists imaging report are general in nature and it is up to your healthcare provider to say whether those recommendations make sense for your situation  You are strongly encouraged to talk to your health care provider about the results and ask whether additional steps need to be taken  3  Where can I get a copy of the final report for my recent radiology exam?  To get a full copy of the report you can access your records online at [email or web address] or please contact [address] [place] [address] Records Department at [phone] Monday through Friday between 8 am and 6 pm          4  What do I need to do now? Please contact your health care provider who requested the imaging study to discuss what further actions (if any) are needed  You may have already heard from (your ordering provider) in regard to this test in which case you can disregard this letter  NOTICE IN ACCORDANCE WITH THE [facility] PATIENT TEST RESULT INFORMATION ACT OF 2018    You are receiving this notice as a result of a determination by your diagnostic imaging service that further discussions of your test results are warranted and would be beneficial to you  The complete results of your test or tests have been or will be sent to the health care practitioner that ordered the test or tests  It is recommended that you contact your health care practitioner to discuss your results as soon as possible

## 2021-06-30 NOTE — RESULT ENCOUNTER NOTE
79 yo PMF for ultrasound due to PMB:   Leiomyomatous uterus , largest 3 2 x 2 8 cm previously 1 4 x 1 2 cm  At least 2 new smaller fibroids are present  Abnormal heterogeneous thickened endometrium with AP caliber of 16 mm  She has an appt with Dr Jessica Aly on 7/8/21 to discuss mgt/ probable hysteroscopy/D&C  TC made to patient to notify her of results

## 2021-07-08 ENCOUNTER — OFFICE VISIT (OUTPATIENT)
Dept: OBGYN CLINIC | Facility: CLINIC | Age: 81
End: 2021-07-08
Payer: MEDICARE

## 2021-07-08 VITALS
BODY MASS INDEX: 32.08 KG/M2 | HEART RATE: 75 BPM | OXYGEN SATURATION: 99 % | WEIGHT: 163.4 LBS | DIASTOLIC BLOOD PRESSURE: 78 MMHG | HEIGHT: 60 IN | SYSTOLIC BLOOD PRESSURE: 132 MMHG

## 2021-07-08 DIAGNOSIS — N95.0 POSTMENOPAUSAL BLEEDING: Primary | ICD-10-CM

## 2021-07-08 DIAGNOSIS — D25.0 FIBROIDS, SUBMUCOSAL: ICD-10-CM

## 2021-07-08 PROBLEM — E03.9 ACQUIRED HYPOTHYROIDISM: Status: ACTIVE | Noted: 2021-07-08

## 2021-07-08 PROBLEM — E78.2 MIXED HYPERLIPIDEMIA: Status: ACTIVE | Noted: 2021-07-08

## 2021-07-08 PROBLEM — I10 ESSENTIAL HYPERTENSION: Status: ACTIVE | Noted: 2021-07-08

## 2021-07-08 PROBLEM — H35.371 EPIRETINAL MEMBRANE (ERM) OF RIGHT EYE: Status: ACTIVE | Noted: 2021-07-08

## 2021-07-08 PROCEDURE — 99214 OFFICE O/P EST MOD 30 MIN: CPT | Performed by: OBSTETRICS & GYNECOLOGY

## 2021-07-08 RX ORDER — DIPHENHYDRAMINE HYDROCHLORIDE 25 MG/1
TABLET ORAL
COMMUNITY

## 2021-07-08 RX ORDER — SODIUM CHLORIDE 9 MG/ML
125 INJECTION, SOLUTION INTRAVENOUS CONTINUOUS
Status: CANCELLED | OUTPATIENT
Start: 2021-07-22

## 2021-07-08 RX ORDER — ASPIRIN 81 MG/1
81 TABLET, CHEWABLE ORAL DAILY
COMMUNITY

## 2021-07-08 NOTE — PROGRESS NOTES
HPI:  Pt is a 78 y o  [id number] with No LMP recorded (lmp unknown)  Patient is postmenopausal  using menopause for contraception presents c/o postmenopausal bleeding  She reports she has noted a tannish color in her underwear in the morning for the last year  She reports she does not turn on the light at night when she goes to the bathroom, but now she does and she notices it is red at night and then looks brown in the am      PMHx:   Past Medical History:   Diagnosis Date    Basal cell carcinoma     chest, thighs    Cancer of the skin, basal cell     Chest and thighs    Constipation     Cystitis     Endometrial mass 11/2017    Endometrial polyp     Last assessed 01/18/18    Fibroids, intramural     2002 and 2008-3 cm fundal with smaller ones   Uterus 9x3x5 cm    Frequency of urination     Glaucoma     b/l    Hematuria, microscopic     Hypercholesteremia     Hypertension     Kidney stone     Macular degeneration     b/l    Morbid obesity ([address] )     Nephrolithiasis     right- in past    Nocturia     OAB (overactive bladder)     PMB (postmenopausal bleeding)     Post-menopausal bleeding 11/2017    Renal mass     Right nephrolithiasis     10/15:5mm sone at right UPJ on CT (prominent uterus, f/u GYN)    Submucous leiomyoma of uterus     Last assessed 01/18/18    Ureteral calculi     Ureteral calculi     Urge incontinence of urine     URI (upper respiratory infection)     resolving, Dr Boggs Dub aware    Uterine fibroid     UTI (urinary tract infection)     Varicella without complication     Wears glasses        PSHx:   Past Surgical History:   Procedure Laterality Date    COLONOSCOPY      CYSTOSCOPY  10/2015    For destruction of right ureteral stone (Ca oxal)    CYSTOSCOPY W/ LASER LITHOTRIPSY Right     CYSTOSCOPY W/ URETERAL STENT PLACEMENT Right     CYSTOSCOPY W/ URETERAL STENT REMOVAL Right     10/15    EYE SURGERY      macular pucker    HYSTEROSCOPY  01/2018    w/removal of submucous leiomyomata- benign endometrium,polyps and myoma    MALIGNANT SKIN LESION EXCISION      BCCA chest and thighs    MYOMECTOMY N/A 1/10/2018    Procedure: HYSTEROSCOPY, D&C, HYSTEROSCOPIC MYOMECTOMY;  Dr Isaac Graves, path: benign    OTHER SURGICAL HISTORY  10/2015    Transurethral removal of internally dwelling urteral stent    SKIN LESION EXCISION      Removal- skin back and thighs       Meds: (Not in a hospital admission)      Allergies: Allergies   Allergen Reactions    Latex Blisters and Rash       Social Hx:    Social History     Socioeconomic History    Marital status: /Civil Union     Spouse name: Not on file    Number of children: Not on file    Years of education: Not on file    Highest education level: Not on file   Occupational History    Not on file   Tobacco Use    Smoking status: Never Smoker    Smokeless tobacco: Never Used   Vaping Use    Vaping Use: Never used   Substance and Sexual Activity    Alcohol use: No    Drug use: No    Sexual activity: Not Currently     Partners: Male   Other Topics Concern    Not on file   Social History Narrative    Inadequate exercise     Social Determinants of Health     Financial Resource Strain:     Difficulty of Paying Living Expenses:    Food Insecurity:     Worried About Running Out of Food in the Last Year:     Ran Out of Food in the Last Year:    Transportation Needs:     Lack of Transportation (Medical):      Lack of Transportation (Non-Medical):    Physical Activity:     Days of Exercise per Week:     Minutes of Exercise per Session:    Stress:     Feeling of Stress :    Social Connections:     Frequency of Communication with Friends and Family:     Frequency of Social Gatherings with Friends and Family:     Attends Jehovah's witness Services:     Active Member of Clubs or Organizations:     Attends Club or Organization Meetings:     Marital Status:    Intimate Partner Violence:     Fear of Current or Ex-Partner:  Emotionally Abused:     Physically Abused:     Sexually Abused:        Ob Hx:   OB History    Para Term  AB Living   2 2 2     2   SAB TAB Ectopic Multiple Live Births           2      # Outcome Date GA Lbr Cristobal/2nd Weight Sex Delivery Anes PTL Lv   2 Term      Vag-Spont      1 Term      Vag-Spont            Fm Hx:   Family History   Problem Relation Age of Onset    Alzheimer's disease Mother     Heart disease Mother     Colon cancer Mother 61    Rectal cancer Father     Colon cancer Father     No Known Problems Maternal Grandmother     No Known Problems Maternal Grandfather     No Known Problems Paternal Grandmother     No Known Problems Paternal Grandfather     Cancer Maternal Aunt     Cancer Maternal Aunt     Diabetes Brother     No Known Problems Son        ROS:  Review of Systems   Constitutional: Negative for chills, fatigue, fever and unexpected weight change  HENT: Negative for congestion, mouth sores and sore throat  Respiratory: Negative for cough, chest tightness, shortness of breath and wheezing  Cardiovascular: Negative for chest pain and palpitations  Gastrointestinal: Negative for abdominal distention, abdominal pain, constipation, diarrhea, nausea and vomiting  Endocrine: Negative for cold intolerance and heat intolerance  Genitourinary: Positive for enuresis (chronoic) and vaginal bleeding  Negative for dyspareunia, dysuria, genital sores, menstrual problem, pelvic pain, vaginal discharge and vaginal pain  Musculoskeletal: Negative for arthralgias  Skin: Negative for color change and rash  Neurological: Negative for dizziness, light-headedness and headaches  Hematological: Negative for adenopathy         VS:  /78 (BP Location: Left arm, Patient Position: Sitting, Cuff Size: Standard)   Pulse 75   Ht 5' (1 524 m)   Wt 74 1 kg (163 lb 6 4 oz)   LMP  (LMP Unknown)   SpO2 99%   Breastfeeding No   BMI 31 91 kg/m²       Exam:    Physical Exam  Constitutional:       General: She is not in acute distress  Appearance: Normal appearance  She is not ill-appearing  HENT:      Head: Normocephalic and atraumatic  Eyes:      Extraocular Movements: Extraocular movements intact  Conjunctiva/sclera: Conjunctivae normal    Cardiovascular:      Rate and Rhythm: Normal rate and regular rhythm  Heart sounds: Normal heart sounds  Pulmonary:      Effort: Pulmonary effort is normal       Breath sounds: Normal breath sounds  Abdominal:      General: Abdomen is flat  Bowel sounds are normal  There is no distension  Palpations: Abdomen is soft  There is no mass  Tenderness: There is no abdominal tenderness  There is no guarding or rebound  Hernia: No hernia is present  Musculoskeletal:         General: Normal range of motion  Cervical back: Normal range of motion and neck supple  No rigidity or tenderness  Skin:     General: Skin is warm  Findings: No erythema or rash  Neurological:      Mental Status: She is alert and oriented to person, place, and time  Psychiatric:         Mood and Affect: Mood normal          Behavior: Behavior normal          Thought Content: Thought content normal          Judgment: Judgment normal                 vulva      normal external genitalia for age, no lesions, masses, epithelial changes, or exudate and pan erythema noted    vagina    color pink and rugae  flattening of rugae    cervix     parous and no lesions     uterus     NSSC, AF, NT, mobile    adnexa   no masses or tenderness     RV        deferred    Labs:  No results found for: WBC, HGB, HCT, PLT  No results found for: PREGTESTUR, PREGSERUM, HCG, HCGQUANT    Imaging:  UTERUS:  The uterus is anteverted in position, measuring 9 3 x 5 6 x 6 6 cm  Diffusely heterogeneous myometrial echotexture with scattered fibroids  2 3 x 2 x 1 8 cm right lateral body intramural fibroid, new    2 2 x 3 2 x 2 8 cm posterior submucosal fibroid previously 1 4 x 1 2 x 1 2 cm  1 x 1 1 x 1 1 cm lateral body intramural fibroid, new  The cervix shows no suspicious abnormality  Small simple nabothian cysts      ENDOMETRIUM:    Thickened AP caliber of 16 mm  Heterogeneous nonvascular appearance of the endometrium with scattered cysts      OVARIES/ADNEXA:  Right ovary:  2 2 x 2 x 1 9 cm  No suspicious right ovarian abnormality  Doppler flow within normal limits      Left ovary:  2 5 x 1 5 x 1 2 cm  No suspicious left ovarian abnormality  Doppler flow within normal limits      No suspicious adnexal mass or loculated collections  There is no free fluid    At least 2 new small fibroids are present      IMPRESSION:     Leiomyomatous uterus , the largest of which measures 3 2 x 2 8 cm previously 1 4 x 1 2 cm  At least 2 new smaller fibroids are present      Abnormal heterogeneous thickened endometrium with AP caliber of 16 mm  A/P: 78 y o  Clarance Fabián with No LMP recorded (lmp unknown)  Patient is postmenopausal  with postmenopausal bleeding, submucosal fibroid  for D&C c with hysteroscopy and possible hysterosocpic myomectomy   The risks (infection, bleeding, transfusion, damage to adjacent organs requiring immediate and/or delayed repair, clots inside blood vessels, need to complete procedure using alternative approach, procedural failure, worsening of pre-exisiting medical condition, and death) and alternatives (see outpatient record) have been discussed and patient desires to proceed with D&C c with hysteroscopy and possible hysterosocpic myomectomy at [facility] on 7/22/2021

## 2021-07-08 NOTE — H&P (VIEW-ONLY)
HPI:  Pt is a 78 y o  [id number] with No LMP recorded (lmp unknown)  Patient is postmenopausal  using menopause for contraception presents c/o postmenopausal bleeding  She reports she has noted a tannish color in her underwear in the morning for the last year  She reports she does not turn on the light at night when she goes to the bathroom, but now she does and she notices it is red at night and then looks brown in the am      PMHx:   Past Medical History:   Diagnosis Date    Basal cell carcinoma     chest, thighs    Cancer of the skin, basal cell     Chest and thighs    Constipation     Cystitis     Endometrial mass 11/2017    Endometrial polyp     Last assessed 01/18/18    Fibroids, intramural     2002 and 2008-3 cm fundal with smaller ones   Uterus 9x3x5 cm    Frequency of urination     Glaucoma     b/l    Hematuria, microscopic     Hypercholesteremia     Hypertension     Kidney stone     Macular degeneration     b/l    Morbid obesity ([address] )     Nephrolithiasis     right- in past    Nocturia     OAB (overactive bladder)     PMB (postmenopausal bleeding)     Post-menopausal bleeding 11/2017    Renal mass     Right nephrolithiasis     10/15:5mm sone at right UPJ on CT (prominent uterus, f/u GYN)    Submucous leiomyoma of uterus     Last assessed 01/18/18    Ureteral calculi     Ureteral calculi     Urge incontinence of urine     URI (upper respiratory infection)     resolving, Dr Clements aware    Uterine fibroid     UTI (urinary tract infection)     Varicella without complication     Wears glasses        PSHx:   Past Surgical History:   Procedure Laterality Date    COLONOSCOPY      CYSTOSCOPY  10/2015    For destruction of right ureteral stone (Ca oxal)    CYSTOSCOPY W/ LASER LITHOTRIPSY Right     CYSTOSCOPY W/ URETERAL STENT PLACEMENT Right     CYSTOSCOPY W/ URETERAL STENT REMOVAL Right     10/15    EYE SURGERY      macular pucker    HYSTEROSCOPY  01/2018    w/removal of submucous leiomyomata- benign endometrium,polyps and myoma    MALIGNANT SKIN LESION EXCISION      BCCA chest and thighs    MYOMECTOMY N/A 1/10/2018    Procedure: HYSTEROSCOPY, D&C, HYSTEROSCOPIC MYOMECTOMY;  Dr Kaiden Garcia, path: benign    OTHER SURGICAL HISTORY  10/2015    Transurethral removal of internally dwelling urteral stent    SKIN LESION EXCISION      Removal- skin back and thighs       Meds: (Not in a hospital admission)      Allergies: Allergies   Allergen Reactions    Latex Blisters and Rash       Social Hx:    Social History     Socioeconomic History    Marital status: /Civil Union     Spouse name: Not on file    Number of children: Not on file    Years of education: Not on file    Highest education level: Not on file   Occupational History    Not on file   Tobacco Use    Smoking status: Never Smoker    Smokeless tobacco: Never Used   Vaping Use    Vaping Use: Never used   Substance and Sexual Activity    Alcohol use: No    Drug use: No    Sexual activity: Not Currently     Partners: Male   Other Topics Concern    Not on file   Social History Narrative    Inadequate exercise     Social Determinants of Health     Financial Resource Strain:     Difficulty of Paying Living Expenses:    Food Insecurity:     Worried About Running Out of Food in the Last Year:     Ran Out of Food in the Last Year:    Transportation Needs:     Lack of Transportation (Medical):      Lack of Transportation (Non-Medical):    Physical Activity:     Days of Exercise per Week:     Minutes of Exercise per Session:    Stress:     Feeling of Stress :    Social Connections:     Frequency of Communication with Friends and Family:     Frequency of Social Gatherings with Friends and Family:     Attends Jain Services:     Active Member of Clubs or Organizations:     Attends Club or Organization Meetings:     Marital Status:    Intimate Partner Violence:     Fear of Current or Ex-Partner:  Emotionally Abused:     Physically Abused:     Sexually Abused:        Ob Hx:   OB History    Para Term  AB Living   2 2 2     2   SAB TAB Ectopic Multiple Live Births           2      # Outcome Date GA Lbr Cristobal/2nd Weight Sex Delivery Anes PTL Lv   2 Term      Vag-Spont      1 Term      Vag-Spont            Fm Hx:   Family History   Problem Relation Age of Onset    Alzheimer's disease Mother     Heart disease Mother     Colon cancer Mother 61    Rectal cancer Father     Colon cancer Father     No Known Problems Maternal Grandmother     No Known Problems Maternal Grandfather     No Known Problems Paternal Grandmother     No Known Problems Paternal Grandfather     Cancer Maternal Aunt     Cancer Maternal Aunt     Diabetes Brother     No Known Problems Son        ROS:  Review of Systems   Constitutional: Negative for chills, fatigue, fever and unexpected weight change  HENT: Negative for congestion, mouth sores and sore throat  Respiratory: Negative for cough, chest tightness, shortness of breath and wheezing  Cardiovascular: Negative for chest pain and palpitations  Gastrointestinal: Negative for abdominal distention, abdominal pain, constipation, diarrhea, nausea and vomiting  Endocrine: Negative for cold intolerance and heat intolerance  Genitourinary: Positive for enuresis (chronoic) and vaginal bleeding  Negative for dyspareunia, dysuria, genital sores, menstrual problem, pelvic pain, vaginal discharge and vaginal pain  Musculoskeletal: Negative for arthralgias  Skin: Negative for color change and rash  Neurological: Negative for dizziness, light-headedness and headaches  Hematological: Negative for adenopathy         VS:  /78 (BP Location: Left arm, Patient Position: Sitting, Cuff Size: Standard)   Pulse 75   Ht 5' (1 524 m)   Wt 74 1 kg (163 lb 6 4 oz)   LMP  (LMP Unknown)   SpO2 99%   Breastfeeding No   BMI 31 91 kg/m²       Exam:    Physical Exam  Constitutional:       General: She is not in acute distress  Appearance: Normal appearance  She is not ill-appearing  HENT:      Head: Normocephalic and atraumatic  Eyes:      Extraocular Movements: Extraocular movements intact  Conjunctiva/sclera: Conjunctivae normal    Cardiovascular:      Rate and Rhythm: Normal rate and regular rhythm  Heart sounds: Normal heart sounds  Pulmonary:      Effort: Pulmonary effort is normal       Breath sounds: Normal breath sounds  Abdominal:      General: Abdomen is flat  Bowel sounds are normal  There is no distension  Palpations: Abdomen is soft  There is no mass  Tenderness: There is no abdominal tenderness  There is no guarding or rebound  Hernia: No hernia is present  Musculoskeletal:         General: Normal range of motion  Cervical back: Normal range of motion and neck supple  No rigidity or tenderness  Skin:     General: Skin is warm  Findings: No erythema or rash  Neurological:      Mental Status: She is alert and oriented to person, place, and time  Psychiatric:         Mood and Affect: Mood normal          Behavior: Behavior normal          Thought Content: Thought content normal          Judgment: Judgment normal                 vulva      normal external genitalia for age, no lesions, masses, epithelial changes, or exudate and pan erythema noted    vagina    color pink and rugae  flattening of rugae    cervix     parous and no lesions     uterus     NSSC, AF, NT, mobile    adnexa   no masses or tenderness     RV        deferred    Labs:  No results found for: WBC, HGB, HCT, PLT  No results found for: PREGTESTUR, PREGSERUM, HCG, HCGQUANT    Imaging:  UTERUS:  The uterus is anteverted in position, measuring 9 3 x 5 6 x 6 6 cm  Diffusely heterogeneous myometrial echotexture with scattered fibroids  2 3 x 2 x 1 8 cm right lateral body intramural fibroid, new    2 2 x 3 2 x 2 8 cm posterior submucosal fibroid previously 1 4 x 1 2 x 1 2 cm  1 x 1 1 x 1 1 cm lateral body intramural fibroid, new  The cervix shows no suspicious abnormality  Small simple nabothian cysts      ENDOMETRIUM:    Thickened AP caliber of 16 mm  Heterogeneous nonvascular appearance of the endometrium with scattered cysts      OVARIES/ADNEXA:  Right ovary:  2 2 x 2 x 1 9 cm  No suspicious right ovarian abnormality  Doppler flow within normal limits      Left ovary:  2 5 x 1 5 x 1 2 cm  No suspicious left ovarian abnormality  Doppler flow within normal limits      No suspicious adnexal mass or loculated collections  There is no free fluid    At least 2 new small fibroids are present      IMPRESSION:     Leiomyomatous uterus , the largest of which measures 3 2 x 2 8 cm previously 1 4 x 1 2 cm  At least 2 new smaller fibroids are present      Abnormal heterogeneous thickened endometrium with AP caliber of 16 mm  A/P: 78 y o  Bart Florien with No LMP recorded (lmp unknown)  Patient is postmenopausal  with postmenopausal bleeding, submucosal fibroid  for D&C c with hysteroscopy and possible hysterosocpic myomectomy   The risks (infection, bleeding, transfusion, damage to adjacent organs requiring immediate and/or delayed repair, clots inside blood vessels, need to complete procedure using alternative approach, procedural failure, worsening of pre-exisiting medical condition, and death) and alternatives (see outpatient record) have been discussed and patient desires to proceed with D&C c with hysteroscopy and possible hysterosocpic myomectomy at [facility] on 7/22/2021

## 2021-07-13 NOTE — PRE-PROCEDURE INSTRUCTIONS
Pre-Surgery Instructions:   Medication Instructions    atorvastatin (LIPITOR) 20 mg tablet Instructed patient per Anesthesia Guidelines   benazepril (LOTENSIN) 10 mg tablet Instructed patient per Anesthesia Guidelines   Biotin 5 MG CAPS Instructed patient per Anesthesia Guidelines   cholecalciferol (VITAMIN D3) 1,000 units tablet Instructed patient per Anesthesia Guidelines   dorzolamide-timolol (COSOPT) 22 3-6 8 MG/ML ophthalmic solution Instructed patient per Anesthesia Guidelines   levothyroxine 25 mcg tablet Instructed patient per Anesthesia Guidelines  Patient may take levothyroxine morning of surgery  Instructed no NSAIDs, aspirins, vitamins, or supplements now until after surgery

## 2021-07-20 ENCOUNTER — ANESTHESIA EVENT (OUTPATIENT)
Dept: PERIOP | Facility: HOSPITAL | Age: 81
End: 2021-07-20
Payer: MEDICARE

## 2021-07-22 ENCOUNTER — HOSPITAL ENCOUNTER (OUTPATIENT)
Facility: HOSPITAL | Age: 81
Setting detail: OUTPATIENT SURGERY
Discharge: HOME | End: 2021-07-22
Attending: OBSTETRICS & GYNECOLOGY | Admitting: OBSTETRICS & GYNECOLOGY
Payer: MEDICARE

## 2021-07-22 ENCOUNTER — ANESTHESIA (OUTPATIENT)
Dept: PERIOP | Facility: HOSPITAL | Age: 81
End: 2021-07-22
Payer: MEDICARE

## 2021-07-22 VITALS
HEIGHT: 63 IN | RESPIRATION RATE: 16 BRPM | BODY MASS INDEX: 28.88 KG/M2 | TEMPERATURE: 97.2 F | HEART RATE: 66 BPM | DIASTOLIC BLOOD PRESSURE: 68 MMHG | OXYGEN SATURATION: 98 % | WEIGHT: 163 LBS | SYSTOLIC BLOOD PRESSURE: 138 MMHG

## 2021-07-22 DIAGNOSIS — D25.0 FIBROIDS, SUBMUCOSAL: ICD-10-CM

## 2021-07-22 DIAGNOSIS — N95.0 POSTMENOPAUSAL BLEEDING: ICD-10-CM

## 2021-07-22 PROBLEM — H40.9 GLAUCOMA: Status: ACTIVE | Noted: 2021-07-22

## 2021-07-22 PROBLEM — Z98.890 S/P DILATION AND CURETTAGE: Status: ACTIVE | Noted: 2021-07-22

## 2021-07-22 LAB
ANION GAP SERPL CALCULATED.3IONS-SCNC: 8 MMOL/L (ref 4–13)
BUN SERPL-MCNC: 20 MG/DL (ref 5–25)
CALCIUM SERPL-MCNC: 9.2 MG/DL (ref 8.3–10.1)
CHLORIDE SERPL-SCNC: 109 MMOL/L (ref 100–108)
CO2 SERPL-SCNC: 27 MMOL/L (ref 21–32)
CREAT SERPL-MCNC: 0.67 MG/DL (ref 0.6–1.3)
ERYTHROCYTE [DISTWIDTH] IN BLOOD BY AUTOMATED COUNT: 14.4 % (ref 11.6–15.1)
GFR SERPL CREATININE-BSD FRML MDRD: 82 ML/MIN/1.73SQ M
GLUCOSE P FAST SERPL-MCNC: 97 MG/DL (ref 65–99)
GLUCOSE SERPL-MCNC: 97 MG/DL (ref 65–140)
HCT VFR BLD AUTO: 47.2 % (ref 34.8–46.1)
HGB BLD-MCNC: 15.1 G/DL (ref 11.5–15.4)
MCH RBC QN AUTO: 27.8 PG (ref 26.8–34.3)
MCHC RBC AUTO-ENTMCNC: 32 G/DL (ref 31.4–37.4)
MCV RBC AUTO: 87 FL (ref 82–98)
PLATELET # BLD AUTO: 173 THOUSANDS/UL (ref 149–390)
PMV BLD AUTO: 12.2 FL (ref 8.9–12.7)
POTASSIUM SERPL-SCNC: 3.9 MMOL/L (ref 3.5–5.3)
RBC # BLD AUTO: 5.43 MILLION/UL (ref 3.81–5.12)
SODIUM SERPL-SCNC: 144 MMOL/L (ref 136–145)
WBC # BLD AUTO: 6.07 THOUSAND/UL (ref 4.31–10.16)

## 2021-07-22 PROCEDURE — 85027 COMPLETE CBC AUTOMATED: CPT | Performed by: OBSTETRICS & GYNECOLOGY

## 2021-07-22 PROCEDURE — 93005 ELECTROCARDIOGRAM TRACING: CPT

## 2021-07-22 PROCEDURE — 80048 BASIC METABOLIC PNL TOTAL CA: CPT | Performed by: OBSTETRICS & GYNECOLOGY

## 2021-07-22 PROCEDURE — 88305 TISSUE EXAM BY PATHOLOGIST: CPT | Performed by: PATHOLOGY

## 2021-07-22 PROCEDURE — 58561 HYSTEROSCOPY REMOVE MYOMA: CPT | Performed by: OBSTETRICS & GYNECOLOGY

## 2021-07-22 RX ORDER — PROPOFOL 10 MG/ML
INJECTION, EMULSION INTRAVENOUS AS NEEDED
Status: DISCONTINUED | OUTPATIENT
Start: 2021-07-22 | End: 2021-07-22

## 2021-07-22 RX ORDER — ALBUTEROL SULFATE 2.5 MG/3ML
2.5 SOLUTION RESPIRATORY (INHALATION) ONCE AS NEEDED
Status: DISCONTINUED | OUTPATIENT
Start: 2021-07-22 | End: 2021-07-22 | Stop reason: HOSPADM

## 2021-07-22 RX ORDER — ONDANSETRON 2 MG/ML
4 INJECTION INTRAMUSCULAR; INTRAVENOUS EVERY 6 HOURS PRN
Status: DISCONTINUED | OUTPATIENT
Start: 2021-07-22 | End: 2021-07-22 | Stop reason: HOSPADM

## 2021-07-22 RX ORDER — SODIUM CHLORIDE 9 MG/ML
125 INJECTION, SOLUTION INTRAVENOUS CONTINUOUS
Status: DISCONTINUED | OUTPATIENT
Start: 2021-07-22 | End: 2021-07-22 | Stop reason: HOSPADM

## 2021-07-22 RX ORDER — LIDOCAINE HYDROCHLORIDE 20 MG/ML
INJECTION, SOLUTION EPIDURAL; INFILTRATION; INTRACAUDAL; PERINEURAL AS NEEDED
Status: DISCONTINUED | OUTPATIENT
Start: 2021-07-22 | End: 2021-07-22

## 2021-07-22 RX ORDER — MAGNESIUM HYDROXIDE 1200 MG/15ML
LIQUID ORAL AS NEEDED
Status: DISCONTINUED | OUTPATIENT
Start: 2021-07-22 | End: 2021-07-22 | Stop reason: HOSPADM

## 2021-07-22 RX ORDER — FENTANYL CITRATE 50 UG/ML
INJECTION, SOLUTION INTRAMUSCULAR; INTRAVENOUS AS NEEDED
Status: DISCONTINUED | OUTPATIENT
Start: 2021-07-22 | End: 2021-07-22

## 2021-07-22 RX ORDER — ONDANSETRON 2 MG/ML
INJECTION INTRAMUSCULAR; INTRAVENOUS AS NEEDED
Status: DISCONTINUED | OUTPATIENT
Start: 2021-07-22 | End: 2021-07-22

## 2021-07-22 RX ORDER — FENTANYL CITRATE/PF 50 MCG/ML
25 SYRINGE (ML) INJECTION
Status: DISCONTINUED | OUTPATIENT
Start: 2021-07-22 | End: 2021-07-22 | Stop reason: HOSPADM

## 2021-07-22 RX ORDER — MIDAZOLAM HYDROCHLORIDE 2 MG/2ML
INJECTION, SOLUTION INTRAMUSCULAR; INTRAVENOUS AS NEEDED
Status: DISCONTINUED | OUTPATIENT
Start: 2021-07-22 | End: 2021-07-22

## 2021-07-22 RX ORDER — ACETAMINOPHEN 325 MG/1
650 TABLET ORAL EVERY 6 HOURS PRN
Status: DISCONTINUED | OUTPATIENT
Start: 2021-07-22 | End: 2021-07-22 | Stop reason: HOSPADM

## 2021-07-22 RX ORDER — IBUPROFEN 600 MG/1
600 TABLET ORAL EVERY 6 HOURS PRN
Status: DISCONTINUED | OUTPATIENT
Start: 2021-07-22 | End: 2021-07-22 | Stop reason: HOSPADM

## 2021-07-22 RX ADMIN — PROPOFOL 100 MG: 10 INJECTION, EMULSION INTRAVENOUS at 13:13

## 2021-07-22 RX ADMIN — SODIUM CHLORIDE: 0.9 INJECTION, SOLUTION INTRAVENOUS at 14:07

## 2021-07-22 RX ADMIN — IBUPROFEN 600 MG: 600 TABLET, FILM COATED ORAL at 17:38

## 2021-07-22 RX ADMIN — SODIUM CHLORIDE: 0.9 INJECTION, SOLUTION INTRAVENOUS at 12:56

## 2021-07-22 RX ADMIN — FENTANYL CITRATE 25 MCG: 50 INJECTION INTRAMUSCULAR; INTRAVENOUS at 13:28

## 2021-07-22 RX ADMIN — MIDAZOLAM 2 MG: 1 INJECTION INTRAMUSCULAR; INTRAVENOUS at 13:09

## 2021-07-22 RX ADMIN — SODIUM CHLORIDE 125 ML/HR: 0.9 INJECTION, SOLUTION INTRAVENOUS at 16:45

## 2021-07-22 RX ADMIN — ONDANSETRON 4 MG: 2 INJECTION INTRAMUSCULAR; INTRAVENOUS at 13:40

## 2021-07-22 RX ADMIN — FENTANYL CITRATE 25 MCG: 50 INJECTION INTRAMUSCULAR; INTRAVENOUS at 13:50

## 2021-07-22 RX ADMIN — FENTANYL CITRATE 50 MCG: 50 INJECTION INTRAMUSCULAR; INTRAVENOUS at 14:41

## 2021-07-22 RX ADMIN — LIDOCAINE HYDROCHLORIDE 80 MG: 20 INJECTION, SOLUTION EPIDURAL; INFILTRATION; INTRACAUDAL; PERINEURAL at 13:13

## 2021-07-22 RX ADMIN — SODIUM CHLORIDE 125 ML/HR: 0.9 INJECTION, SOLUTION INTRAVENOUS at 12:25

## 2021-07-22 NOTE — OP NOTE
OPERATIVE REPORT  PATIENT NAME: Margret Alejandre    :  1940  MRN: [id number]  Pt Location: [facility] OR ROOM 07    SURGERY DATE: 2021    Surgeon(s) and Role:     * Farhana Oropeza MD - Primary     * Christa Arevalo MD - Assisting    Preop Diagnosis:  Postmenopausal bleeding [N95 0]  Fibroids, submucosal [D25 0]    Post-Op Diagnosis Codes:     * Postmenopausal bleeding [N95 0]     * Fibroids, submucosal [D25 0]     *endocervical polyp    Procedure(s) (LRB):  D&C, , HYSTEROSCOPY, RESECTION OF ENDOMETRIAL MASSS (N/A)  ENDOCERVICAL POLYPECTOMY, (N/A)    Specimen(s):  ID Type Source Tests Collected by Time Destination   1 : Endocervical Polyp Tissue Polyp, Cervical/Endometrial TISSUE EXAM Farhana Oropeza MD 2021 1335    2 : [facility] Tissue Endometrium TISSUE EXAM Farhana Oropeza MD 2021 1442    3 : Endometrial Mass Tissue Endometrium TISSUE EXAM Farhana Oropeza MD 2021 1443        Estimated Blood Loss:   75 mL    UOP: 100 cc  IVF: 1600 cc  Deficit: 470 cc    Anesthesia Type:   LMA    Operative Indications:  Postmenopausal bleeding [N95 0]  Fibroids, submucosal [D25 0]    Operative Findings:  Normal appearing external female genitalia  Non-gravid anteverted uterus sounding to 10 cm  Normal appearing cervical surface with endocervical polyp extending into vagina  Myoma at fundal wall and multiple masses throughout cavity obscuring visualization of ostea    Complications:   None apparent      Procedure and Technique:  Patient was taken to the operating room  General LMA anesthesia (LMA) was administered and the patient was positioned on the OR table in the dorsal lithotomy position  All pressure points were padded and a lia hugger was placed to maintain control of core body temperature  A bimanual exam was performed and the uterus was noted to be anteverted, normal in size and consistency with no palpable adnexal masses or fullness  The patient was prepped and draped in the usual sterile fashion      A time out was performed to confirm correct patient and correct procedure  A straight catheter was introduced into the bladder, which was drained of 100 cc of clear yellow urine  A weighted speculum was inserted into the vagina and a Richardson retractor was used to visualize the anterior lip of the cervix, which was then grasped with a single toothed tenaculum  An endocervical polyp was noted to be extruding through the cervical os  The uterus was sounded to 10 cm  Ringed forceps were used to resect the endocervical polyp  The cervix was serially dilated to 18 [place] using Hanks dilators for introduction of the hysteroscope  Myosure accommodating hysteroscope was introduced under direct visualization using normal saline solution as the distention media  The hysteroscope was unable to be advanced to the uterine fundus due to a large number of endometrial masses that appeared to be polypoid in nature with a large mass that appeared to be consistent with a submucosal fibroid  The Mysoure Reach was introduced and the myoma and endometrial masses were resected  Sharp curetting was then performed, starting at the 12'oclock position and rotating a total of 360 degrees to cover all surfaces  Endocervical polyp, endometrial mass, and endometrial curettings were sent for pathology  The hysteroscope was then re-introduced under direct visualization, again using normal saline solution as the distention media  Entire uterine cavity was inspected in a systematic manner  There was a small amount of tissue at the fundus noted, but was unable to be resected  The single toothed tenaculum was removed from the anterior lip of the cervix  Good hemostasis was confirmed at the tenaculum puncture sites  Weighted speculum was then removed from the vagina  At the conclusion of the procedure, all needle, sponge, and instrument counts were noted to be correct x2       Dr Yvonne Haynes was present and participated in all key portions of the case     Patient Disposition:  PACU     SIGNATURE: Kwadwo Arreaga MD  DATE: July 22, 2021  TIME: 3:36 PM    I agree with the operative report as dictated by Dr Hafsa Givens and edited by me  I was present for and participated in the entire procedure      Barak Geller MD

## 2021-07-22 NOTE — ANESTHESIA POSTPROCEDURE EVALUATION
Post-Op Assessment Note    CV Status:  Stable    Pain management: adequate     Mental Status:  Alert and awake   Hydration Status:  Euvolemic   PONV Controlled:  Controlled   Airway Patency:  Patent      Post Op Vitals Reviewed: Yes      Staff: Anesthesiologist         No complications documented      /65 (07/22/21 1645)    Temp      Pulse     Resp      SpO2

## 2021-07-22 NOTE — ANESTHESIA PREPROCEDURE EVALUATION
Procedure:  D&C W/HYSTEROSCOPY (N/A Uterus)  MYOMECTOMY; submucosal (N/A Uterus)    Relevant Problems   CARDIO   (+) Essential hypertension   (+) Mixed hyperlipidemia      ENDO   (+) Acquired hypothyroidism      Other   (+) Glaucoma   (+) Postmenopausal bleeding        Physical Exam    Airway    Mallampati score: II  TM Distance: >3 FB  Neck ROM: full     Dental       Cardiovascular  Rhythm: regular, Rate: normal, Cardiovascular exam normal    Pulmonary  Pulmonary exam normal Breath sounds clear to auscultation,     Other Findings        Anesthesia Plan  ASA Score- 2     Anesthesia Type- general with ASA Monitors  Additional Monitors:   Airway Plan:           Plan Factors-    Chart reviewed  Existing labs reviewed  Patient summary reviewed  Patient is not a current smoker  Patient instructed to abstain from smoking on day of procedure  Induction- intravenous  Postoperative Plan-     Informed Consent- Anesthetic plan and risks discussed with patient

## 2021-07-22 NOTE — INTERVAL H&P NOTE
H&P reviewed  After examining the patient I find no changes in the patients condition since the H&P had been written      Vitals:    07/22/21 1153   BP: 134/75   Pulse: 69   Resp: 16   Temp: 97 5 °F (36 4 °C)   SpO2: 96%

## 2021-07-22 NOTE — DISCHARGE INSTRUCTIONS
Dilation and Curettage   WHAT YOU SHOULD KNOW:   Dilation and curettage (D and C) is a procedure to remove tissue from the lining of your uterus  INSTRUCTIONS:   Medicines:   · Pain medicine: You may be given medicine to take away or decrease pain  Do not wait until the pain is severe before you take your medicine  · Antibiotics: This medicine will help fight or prevent an infection  · Take your medicine as directed  Call your healthcare provider if you think your medicine is not helping or if you have side effects  Tell him if you are allergic to any medicine  Keep a list of the medicines, vitamins, and herbs you take  Include the amounts, and when and why you take them  Bring the list or the pill bottles to follow-up visits  Carry your medicine list with you in case of an emergency  Follow up with your healthcare provider as directed:  Write down your questions so you remember to ask them during your visits  Activity:  Ask your primary healthcare provider when you can return to your normal activities  Contact your primary healthcare provider if:   · You have foul-smelling vaginal discharge  · You do not get your monthly period  · You feel depressed or anxious  · You feel very tired and weak  · You have questions or concerns about your condition or care  Return to the emergency department if:   · You have heavy vaginal bleeding that soaks 1 pad in 1 hour for 2 hours in a row  · You have a fever and abdominal cramps  · Your pain does not get better, even after treatment  © 2014 [employer] is for End User's use only and may not be sold, redistributed or otherwise used for commercial purposes  All illustrations and images included in CareNotes® are the copyrighted property of A [employer] M [employer]  or [employer]  The above information is an  only  It is not intended as medical advice for individual conditions or treatments   Talk to your doctor, nurse or pharmacist before following any medical regimen to see if it is safe and effective for you

## 2021-07-23 LAB
ATRIAL RATE: 69 BPM
P AXIS: 55 DEGREES
PR INTERVAL: 170 MS
QRS AXIS: -10 DEGREES
QRSD INTERVAL: 64 MS
QT INTERVAL: 392 MS
QTC INTERVAL: 420 MS
T WAVE AXIS: 8 DEGREES
VENTRICULAR RATE: 69 BPM

## 2021-07-23 PROCEDURE — 93010 ELECTROCARDIOGRAM REPORT: CPT | Performed by: INTERNAL MEDICINE

## 2021-08-03 ENCOUNTER — OFFICE VISIT (OUTPATIENT)
Dept: OBGYN CLINIC | Facility: CLINIC | Age: 81
End: 2021-08-03

## 2021-08-03 VITALS
BODY MASS INDEX: 29.06 KG/M2 | WEIGHT: 164 LBS | DIASTOLIC BLOOD PRESSURE: 74 MMHG | SYSTOLIC BLOOD PRESSURE: 130 MMHG | HEIGHT: 63 IN

## 2021-08-03 DIAGNOSIS — Z98.890 S/P DILATION AND CURETTAGE: Primary | ICD-10-CM

## 2021-08-03 PROCEDURE — 99024 POSTOP FOLLOW-UP VISIT: CPT | Performed by: OBSTETRICS & GYNECOLOGY

## 2021-08-03 NOTE — PROGRESS NOTES
Patient is a 78 y o  [id number] with No LMP recorded (lmp unknown)  Patient is postmenopausal  who presents for postoperative examination s/p D&C, hysteroscopy, resection of endometrial masses  Pt reports after the surgery she had bleeding like a light period, but it is very scant now  She denies pain, fevers, chills, discharge or abnormal odor  Pathology reviewed with patient and benign  F/U for annual examination or prn  Past Medical History:   Diagnosis Date    Basal cell carcinoma     chest, thighs    Cancer of the skin, basal cell     Chest and thighs    Constipation     Cystitis     Endometrial mass 11/2017    Endometrial polyp     Last assessed 01/18/18    Fibroids, intramural     2002 and 2008-3 cm fundal with smaller ones   Uterus 9x3x5 cm    Frequency of urination     Glaucoma     b/l    Hematuria, microscopic     Hypercholesteremia     Hypertension     Kidney stone     Macular degeneration     b/l    Morbid obesity ([address] )     Nephrolithiasis     right- in past    Nocturia     OAB (overactive bladder)     PMB (postmenopausal bleeding)     Post-menopausal bleeding 11/2017    Renal mass     Right nephrolithiasis     10/15:5mm sone at right UPJ on CT (prominent uterus, f/u GYN)    Submucous leiomyoma of uterus     Last assessed 01/18/18    Ureteral calculi     Ureteral calculi     Urge incontinence of urine     URI (upper respiratory infection)     resolving, Dr Dana Estrada aware    Uterine fibroid     UTI (urinary tract infection)     Varicella without complication     Wears glasses        Past Surgical History:   Procedure Laterality Date    COLONOSCOPY      CYSTOSCOPY  10/2015    For destruction of right ureteral stone (Ca oxal)    CYSTOSCOPY W/ LASER LITHOTRIPSY Right     CYSTOSCOPY W/ URETERAL STENT PLACEMENT Right     CYSTOSCOPY W/ URETERAL STENT REMOVAL Right     10/15    EYE SURGERY      macular pucker    HYSTEROSCOPY  01/2018    w/removal of submucous leiomyomata- benign endometrium,polyps and myoma    MALIGNANT SKIN LESION EXCISION      BCCA chest and thighs    MYOMECTOMY N/A 1/10/2018    Procedure: HYSTEROSCOPY, D&C, HYSTEROSCOPIC MYOMECTOMY;  Dr Gwendolyn Ceballos, path: benign    MYOMECTOMY N/A 2021    Procedure: ENDOCERVICAL POLYPECTOMY,;  Surgeon: Melchor Garcia MD;  Location: [facility];  Service: Gynecology    OTHER SURGICAL HISTORY  10/2015    Transurethral removal of internally dwelling urteral stent    WI HYSTEROSCOPY,W/ENDO BX N/A 2021    Procedure: D&C, , HYSTEROSCOPY, RESECTION OF ENDOMETRIAL MASSS;  Surgeon: Melchor Garcia MD;  Location: [facility];  Service: Gynecology    SKIN LESION EXCISION      Removal- skin back and thighs       OB History    Para Term  AB Living   2 2 2     2   SAB TAB Ectopic Multiple Live Births           2      # Outcome Date GA Lbr Cristobal/2nd Weight Sex Delivery Anes PTL Lv   2 Term      Vag-Spont      1 Term      Vag-Spont                Current Outpatient Medications:     atorvastatin (LIPITOR) 20 mg tablet, Take 20 mg by mouth daily, Disp: , Rfl:     benazepril (LOTENSIN) 10 mg tablet, Take 10 mg by mouth every evening, Disp: , Rfl:     Biotin 5 MG CAPS, , Disp: , Rfl:     cholecalciferol (VITAMIN D3) 1,000 units tablet, Take 1,000 Units by mouth daily, Disp: , Rfl:     dorzolamide-timolol (COSOPT) 22 3-6 8 MG/ML ophthalmic solution, 1 drop 2 (two) times a day, Disp: , Rfl:     levothyroxine 25 mcg tablet, Take 25 mcg by mouth daily, Disp: , Rfl:     aspirin 81 mg chewable tablet, Chew 81 mg daily (Patient not taking: Reported on 2021), Disp: , Rfl:     travoprost (TRAVATAN-Z) 0 004 % ophthalmic solution, 1 drop daily at bedtime (Patient not taking: Reported on 2021), Disp: , Rfl:     Allergies   Allergen Reactions    Latex Blisters and Rash       Social History     Socioeconomic History    Marital status: /Civil Union     Spouse name: None    Number of children: None    Years of education: None    Highest education level: None   Occupational History    None   Tobacco Use    Smoking status: Never Smoker    Smokeless tobacco: Never Used   Vaping Use    Vaping Use: Never used   Substance and Sexual Activity    Alcohol use: No    Drug use: No    Sexual activity: Not Currently     Partners: Male   Other Topics Concern    None   Social History Narrative    Inadequate exercise     Social Determinants of Health     Financial Resource Strain:     Difficulty of Paying Living Expenses:    Food Insecurity:     Worried About Running Out of Food in the Last Year:     Ran Out of Food in the Last Year:    Transportation Needs:     Lack of Transportation (Medical):  Lack of Transportation (Non-Medical):    Physical Activity:     Days of Exercise per Week:     Minutes of Exercise per Session:    Stress:     Feeling of Stress :    Social Connections:     Frequency of Communication with Friends and Family:     Frequency of Social Gatherings with Friends and Family:     Attends Taoist Services:     Active Member of Clubs or Organizations:     Attends Club or Organization Meetings:     Marital Status:    Intimate Partner Violence:     Fear of Current or Ex-Partner:     Emotionally Abused:     Physically Abused:     Sexually Abused:        Family History   Problem Relation Age of Onset    Alzheimer's disease Mother     Heart disease Mother     Colon cancer Mother 61    Rectal cancer Father     Colon cancer Father     No Known Problems Maternal Grandmother     No Known Problems Maternal Grandfather     No Known Problems Paternal Grandmother     No Known Problems Paternal Grandfather     Cancer Maternal Aunt     Cancer Maternal Aunt     Diabetes Brother     No Known Problems Son        Review of Systems   Constitutional: Negative for chills, fatigue, fever and unexpected weight change  HENT: Negative for congestion, mouth sores and sore throat  Respiratory: Negative for cough, chest tightness, shortness of breath and wheezing  Cardiovascular: Negative for chest pain and palpitations  Gastrointestinal: Negative for abdominal distention, abdominal pain, constipation, diarrhea, nausea and vomiting  Endocrine: Negative for cold intolerance and heat intolerance  Genitourinary: Positive for vaginal bleeding (see HPI)  Negative for dyspareunia, dysuria, genital sores, menstrual problem, pelvic pain, vaginal discharge and vaginal pain  Musculoskeletal: Negative for arthralgias  Skin: Negative for color change and rash  Neurological: Negative for dizziness, light-headedness and headaches  Hematological: Negative for adenopathy  Blood pressure 130/74, height 5' 3" (1 6 m), weight 74 4 kg (164 lb), not currently breastfeeding  and Body mass index is 29 05 kg/m²  Physical Exam  Constitutional:       General: She is not in acute distress  Appearance: Normal appearance  She is not ill-appearing  HENT:      Head: Normocephalic and atraumatic  Eyes:      Extraocular Movements: Extraocular movements intact  Conjunctiva/sclera: Conjunctivae normal    Pulmonary:      Effort: Pulmonary effort is normal    Abdominal:      General: There is no distension  Palpations: Abdomen is soft  There is no mass  Tenderness: There is no abdominal tenderness  There is no guarding or rebound  Hernia: No hernia is present  Musculoskeletal:         General: Normal range of motion  Skin:     General: Skin is warm  Findings: No erythema or rash  Neurological:      Mental Status: She is alert and oriented to person, place, and time  Psychiatric:         Mood and Affect: Mood normal          Behavior: Behavior normal          Thought Content:  Thought content normal          Judgment: Judgment normal           vulva: normal external genitalia for age and no lesions, masses, epithelial changes, or exudate  vagina: color pink and rugae  well formed rugae  cervix: parous, no lesions  and no bleeding noted  uterus: NSSC, AF, NT, mobile  adnexa: no masses or tenderness      A/P:  Pt is a 78 y o  [id number] with      Soundra Sleet was seen today for post-op  Diagnoses and all orders for this visit:    S/P dilation and curettage    doing well  F/u for annual examination or prn

## 2021-08-25 ENCOUNTER — OFFICE VISIT (OUTPATIENT)
Dept: UROLOGY | Facility: MEDICAL CENTER | Age: 81
End: 2021-08-25
Payer: MEDICARE

## 2021-08-25 VITALS
DIASTOLIC BLOOD PRESSURE: 80 MMHG | WEIGHT: 164 LBS | HEIGHT: 63 IN | BODY MASS INDEX: 29.06 KG/M2 | SYSTOLIC BLOOD PRESSURE: 122 MMHG

## 2021-08-25 DIAGNOSIS — N39.41 URGE INCONTINENCE OF URINE: Primary | ICD-10-CM

## 2021-08-25 DIAGNOSIS — D17.71 ANGIOMYOLIPOMA OF RIGHT KIDNEY: ICD-10-CM

## 2021-08-25 LAB
SL AMB  POCT GLUCOSE, UA: NORMAL
SL AMB LEUKOCYTE ESTERASE,UA: NORMAL
SL AMB POCT BILIRUBIN,UA: NORMAL
SL AMB POCT BLOOD,UA: NORMAL
SL AMB POCT CLARITY,UA: CLEAR
SL AMB POCT COLOR,UA: YELLOW
SL AMB POCT KETONES,UA: NORMAL
SL AMB POCT NITRITE,UA: NORMAL
SL AMB POCT PH,UA: 6
SL AMB POCT SPECIFIC GRAVITY,UA: 1.02
SL AMB POCT URINE PROTEIN: NORMAL
SL AMB POCT UROBILINOGEN: 0.2

## 2021-08-25 PROCEDURE — 81003 URINALYSIS AUTO W/O SCOPE: CPT | Performed by: PHYSICIAN ASSISTANT

## 2021-08-25 PROCEDURE — 99214 OFFICE O/P EST MOD 30 MIN: CPT | Performed by: PHYSICIAN ASSISTANT

## 2021-08-25 PROCEDURE — 1123F ACP DISCUSS/DSCN MKR DOCD: CPT | Performed by: PHYSICIAN ASSISTANT

## 2021-08-25 NOTE — PROGRESS NOTES
8/25/2021      Chief Complaint   Patient presents with    Urinary Incontinence         Assessment and Plan    78 y o  female managed by Dr Nancy Sandhu     1  Urge urinary incontinence  · Urine today: small leukocytes  · S/p PTNS in 2018  · Previously managed with Myrbetriq, unable to tolerate secondary to constipation  · Uninterested in further treatment or intervention at this time  · Reviewed bladder irritants  · Increase daily water intake to 40-60 ounces  · Follow up in 1 year  2  Angiomyolipoma   · 2 2 cm right renal AML  Stable since 2015  · Will order renal ultrasound for routine follow up  · She does not require annual imaging  · Currently asymptomatic  History of Present Illness  Leon Swift is a 78 y o  female here for evaluation of urge urinary incontinence and AML  Patient states her symptoms have remained about the same from her previous appointment in 2019 with Dr Nancy Sandhu  He has been managed in the past on Myrbetriq for her urge urinary incontinence but had to discontinue this medication secondary to constipation  She was also treated with PTNS with her last treatment back in October of 2018  She states that she noticed some improvement with the treatments, but not a substantial improvement  She wears 1 pad per day and feels as if this manages her symptoms well  Her urinary frequency and urgency due make in daily activities more difficult she does not wish to pursue any further intervention or treatment at this time  She has nocturia 2-3 times per night  She denies sensation of incomplete bladder emptying  She denies any dysuria  She denies any gross hematuria  She denies any fevers or chills  She denies any flank or suprapubic pain  Does have past medical history of nephrolithiasis as well  Review of Systems   Constitutional: Negative for chills and fever  HENT: Negative  Respiratory: Negative  Cardiovascular: Negative      Genitourinary: Positive for frequency and urgency  Negative for difficulty urinating, dysuria, flank pain and hematuria  Nocturia 2-3x  Denies sensation of incomplete bladder emptying  Musculoskeletal: Positive for back pain  Vitals  Vitals:    08/25/21 0855   BP: 122/80   Weight: 74 4 kg (164 lb)   Height: 5' 3" (1 6 m)       Physical Exam  Vitals reviewed  Constitutional:       General: She is not in acute distress  Appearance: Normal appearance  She is not ill-appearing, toxic-appearing or diaphoretic  HENT:      Head: Normocephalic and atraumatic  Eyes:      Conjunctiva/sclera: Conjunctivae normal    Pulmonary:      Effort: Pulmonary effort is normal  No respiratory distress  Abdominal:      General: There is no distension  Palpations: Abdomen is soft  Tenderness: There is no abdominal tenderness  There is no right CVA tenderness, left CVA tenderness, guarding or rebound  Musculoskeletal:         General: Normal range of motion  Cervical back: Normal range of motion  Skin:     General: Skin is warm and dry  Neurological:      General: No focal deficit present  Mental Status: She is alert and oriented to person, place, and time  Psychiatric:         Mood and Affect: Mood normal          Behavior: Behavior normal          Thought Content: Thought content normal          Judgment: Judgment normal        Past History  Past Medical History:   Diagnosis Date    Basal cell carcinoma     chest, thighs    Cancer of the skin, basal cell     Chest and thighs    Constipation     Cystitis     Endometrial mass 11/2017    Endometrial polyp     Last assessed 01/18/18    Fibroids, intramural     2002 and 2008-3 cm fundal with smaller ones   Uterus 9x3x5 cm    Frequency of urination     Glaucoma     b/l    Hematuria, microscopic     Hypercholesteremia     Hypertension     Kidney stone     Macular degeneration     b/l    Morbid obesity ([address] )     Nephrolithiasis     right- in past    Nocturia     OAB (overactive bladder)     PMB (postmenopausal bleeding)     Post-menopausal bleeding 11/2017    Renal mass     Right nephrolithiasis     10/15:5mm sone at right UPJ on CT (prominent uterus, f/u GYN)    Submucous leiomyoma of uterus     Last assessed 01/18/18    Ureteral calculi     Ureteral calculi     Urge incontinence of urine     URI (upper respiratory infection)     resolving, Dr Radha Parra aware    Uterine fibroid     UTI (urinary tract infection)     Varicella without complication     Wears glasses      Social History     Socioeconomic History    Marital status: /Civil Union     Spouse name: None    Number of children: None    Years of education: None    Highest education level: None   Occupational History    None   Tobacco Use    Smoking status: Never Smoker    Smokeless tobacco: Never Used   Vaping Use    Vaping Use: Never used   Substance and Sexual Activity    Alcohol use: No    Drug use: No    Sexual activity: Not Currently     Partners: Male   Other Topics Concern    None   Social History Narrative    Inadequate exercise     Social Determinants of Health     Financial Resource Strain:     Difficulty of Paying Living Expenses:    Food Insecurity:     Worried About Running Out of Food in the Last Year:     Ran Out of Food in the Last Year:    Transportation Needs:     Lack of Transportation (Medical):      Lack of Transportation (Non-Medical):    Physical Activity:     Days of Exercise per Week:     Minutes of Exercise per Session:    Stress:     Feeling of Stress :    Social Connections:     Frequency of Communication with Friends and Family:     Frequency of Social Gatherings with Friends and Family:     Attends Lutheran Services:     Active Member of Clubs or Organizations:     Attends Club or Organization Meetings:     Marital Status:    Intimate Partner Violence:     Fear of Current or Ex-Partner:     Emotionally Abused:     Physically Abused:     Sexually Abused:      Social History     Tobacco Use   Smoking Status Never Smoker   Smokeless Tobacco Never Used     Family History   Problem Relation Age of Onset    Alzheimer's disease Mother     Heart disease Mother     Colon cancer Mother 61    Rectal cancer Father     Colon cancer Father     No Known Problems Maternal Grandmother     No Known Problems Maternal Grandfather     No Known Problems Paternal Grandmother     No Known Problems Paternal Grandfather     Cancer Maternal Aunt     Cancer Maternal Aunt     Diabetes Brother     No Known Problems Son        The following portions of the patient's history were reviewed and updated as appropriate: allergies, current medications, past medical history, past social history, past surgical history and problem list     Results  No results found for this or any previous visit (from the past 1 hour(s))  ]  No results found for: PSA  Lab Results   Component Value Date    CALCIUM 9 2 07/22/2021    K 3 9 07/22/2021    CO2 27 07/22/2021     (H) 07/22/2021    BUN 20 07/22/2021    CREATININE 0 67 07/22/2021     Lab Results   Component Value Date    WBC 6 07 07/22/2021    HGB 15 1 07/22/2021    HCT 47 2 (H) 07/22/2021    MCV 87 07/22/2021     07/22/2021     Tessa Sims PA-C

## 2021-09-01 ENCOUNTER — HOSPITAL ENCOUNTER (OUTPATIENT)
Dept: ULTRASOUND IMAGING | Facility: MEDICAL CENTER | Age: 81
Discharge: HOME | End: 2021-09-01
Payer: MEDICARE

## 2021-09-01 DIAGNOSIS — D17.71 ANGIOMYOLIPOMA OF RIGHT KIDNEY: ICD-10-CM

## 2021-09-01 PROCEDURE — 76770 US EXAM ABDO BACK WALL COMP: CPT

## 2022-06-09 ENCOUNTER — HOSPITAL ENCOUNTER (OUTPATIENT)
Dept: BONE DENSITY | Facility: MEDICAL CENTER | Age: 82
Discharge: HOME | End: 2022-06-09
Payer: MEDICARE

## 2022-06-09 DIAGNOSIS — M89.9 BONE DISEASE: ICD-10-CM

## 2022-06-09 PROCEDURE — 77080 DXA BONE DENSITY AXIAL: CPT

## 2022-08-11 ENCOUNTER — ANNUAL EXAM (OUTPATIENT)
Dept: OBGYN CLINIC | Facility: CLINIC | Age: 82
End: 2022-08-11
Payer: MEDICARE

## 2022-08-11 VITALS
HEIGHT: 63 IN | SYSTOLIC BLOOD PRESSURE: 116 MMHG | BODY MASS INDEX: 29.7 KG/M2 | DIASTOLIC BLOOD PRESSURE: 70 MMHG | WEIGHT: 167.6 LBS

## 2022-08-11 DIAGNOSIS — E58 DIETARY CALCIUM DEFICIENCY: ICD-10-CM

## 2022-08-11 DIAGNOSIS — Z01.419 ENCOUNTER FOR ANNUAL ROUTINE GYNECOLOGICAL EXAMINATION: Primary | ICD-10-CM

## 2022-08-11 DIAGNOSIS — Z12.31 VISIT FOR SCREENING MAMMOGRAM: ICD-10-CM

## 2022-08-11 DIAGNOSIS — Z72.3 INADEQUATE EXERCISE: ICD-10-CM

## 2022-08-11 PROBLEM — N94.89 ENDOMETRIAL MASS: Status: RESOLVED | Noted: 2018-01-07 | Resolved: 2022-08-11

## 2022-08-11 PROBLEM — Z78.0 OSTEOPENIA AFTER MENOPAUSE: Status: ACTIVE | Noted: 2022-08-11

## 2022-08-11 PROBLEM — M85.80 OSTEOPENIA AFTER MENOPAUSE: Status: ACTIVE | Noted: 2022-08-11

## 2022-08-11 PROCEDURE — G0101 CA SCREEN;PELVIC/BREAST EXAM: HCPCS | Performed by: OBSTETRICS & GYNECOLOGY

## 2022-08-11 NOTE — PROGRESS NOTES
Pt is a78 y o  [id number] ,menopausal, who presents for preventive care  Partner same ; lifetime  1 partner         safe sexual practices followed: not active     does feel safe in relationship: yes    Dairy: 1 c milk daily, 1 cheese 5x/week  Vitamin D: none  Exercise: none  Pap: 11/2015-wnl, HRHPV neg, no longer indicated  Mammogram: 9/11/2019-wnl, repeat rx given  Colonoscopy: 2018-wnl, repeat 5 years  DEXA: 6/9/2022-osteopenia left fem neck, repeat 2 years  safety at home:  yes  tobacco use N  Past Medical History:   Diagnosis Date    Basal cell carcinoma     chest, thighs    Cancer of the skin, basal cell     Chest and thighs    Constipation     Cystitis     Endometrial mass 11/2017    Endometrial polyp     Last assessed 01/18/18    Fibroids, intramural     2002 and 2008-3 cm fundal with smaller ones   Uterus 9x3x5 cm    Frequency of urination     Glaucoma     b/l    Hematuria, microscopic     Hypercholesteremia     Hypertension     Kidney stone     Macular degeneration     b/l    Morbid obesity (HCC)     Nephrolithiasis     right- in past    Nocturia     OAB (overactive bladder)     Pap smear for cervical cancer screening     denies h o abnormal; 11/2015-wnl, HRHPV neg    PMB (postmenopausal bleeding)     Post-menopausal bleeding 11/2017    Renal mass     Right nephrolithiasis     10/15:5mm sone at right UPJ on CT (prominent uterus, f/u GYN)    Submucous leiomyoma of uterus     Last assessed 01/18/18    Ureteral calculi     Ureteral calculi     Urge incontinence of urine     URI (upper respiratory infection)     resolving, Dr Alise Vasquez aware    Uterine fibroid     UTI (urinary tract infection)     Varicella without complication     Wears glasses        Past Surgical History:   Procedure Laterality Date    COLONOSCOPY      2018-wnl, repeat 5 years d/t fam hx    CYSTOSCOPY  10/2015    For destruction of right ureteral stone (Ca oxal)    CYSTOSCOPY W/ LASER LITHOTRIPSY Right  CYSTOSCOPY W/ URETERAL STENT PLACEMENT Right     CYSTOSCOPY W/ URETERAL STENT REMOVAL Right     10/15    EYE SURGERY      macular pucker    HYSTEROSCOPY  2018    w/removal of submucous leiomyomata- benign endometrium,polyps and myoma    MALIGNANT SKIN LESION EXCISION      BCCA chest and thighs    MYOMECTOMY N/A 01/10/2018    Procedure: HYSTEROSCOPY, D&C, HYSTEROSCOPIC MYOMECTOMY;  Dr Justin Yi, path: benign    MYOMECTOMY N/A 2021    Procedure: ENDOCERVICAL POLYPECTOMY,;  Surgeon: Hollie Cole MD;  [facility];  Service: Gynecology    OTHER SURGICAL HISTORY  10/2015    Transurethral removal of internally dwelling urteral stent    LA HYSTEROSCOPY,W/ENDO BX N/A 2021    Procedure: D&C, , HYSTEROSCOPY, RESECTION OF ENDOMETRIAL MASSS;  Surgeon: Hollie Cole MD;  [facility];  Service: Gynecology    SKIN LESION EXCISION      Removal- skin back and thighs       Ob Hx:   OB History    Para Term  AB Living   2 2 2     2   SAB IAB Ectopic Multiple Live Births           2      # Outcome Date GA Lbr Cristobal/2nd Weight Sex Delivery Anes PTL Lv   2 Term      Vag-Spont      1 Term      Vag-Spont         Obstetric Comments   Menarche:10      Menopause:46           Current Outpatient Medications:     aspirin 81 mg chewable tablet, Chew 81 mg daily, Disp: , Rfl:     atorvastatin (LIPITOR) 20 mg tablet, Take 20 mg by mouth daily, Disp: , Rfl:     benazepril (LOTENSIN) 10 mg tablet, Take 10 mg by mouth every evening, Disp: , Rfl:     Biotin 5 MG CAPS, , Disp: , Rfl:     cholecalciferol (VITAMIN D3) 1,000 units tablet, Take 1,000 Units by mouth daily, Disp: , Rfl:     dorzolamide-timolol (COSOPT) 22 3-6 8 MG/ML ophthalmic solution, 1 drop 2 (two) times a day, Disp: , Rfl:     levothyroxine 25 mcg tablet, Take 25 mcg by mouth daily, Disp: , Rfl:     travoprost (TRAVATAN-Z) 0 004 % ophthalmic solution, 1 drop daily at bedtime (Patient not taking: No sig reported), Disp: , Rfl:     Allergies   Allergen Reactions    Latex Blisters and Rash       Social History     Socioeconomic History    Marital status: /Civil Union     Spouse name: Tiffani Dang Number of children: 2    Years of education: None    Highest education level: Bachelor's degree (e g , BA, AB, BS)   Occupational History    None   Tobacco Use    Smoking status: Never Smoker    Smokeless tobacco: Never Used   Vaping Use    Vaping Use: Never used   Substance and Sexual Activity    Alcohol use: No    Drug use: No    Sexual activity: Not Currently     Partners: Male     Comment: lifetime partners: 1   Other Topics Concern    None   Social History Narrative    [employer]    Accepts blood products        Exercise: none    Calcium: 1 cup milk daily, 1 cheese 5x/weekly,      Social Determinants of Health     Financial Resource Strain: Not on file   Food Insecurity: Not on file   Transportation Needs: Not on file   Physical Activity: Not on file   Stress: Not on file   Social Connections: Not on file   Intimate Partner Violence: Not on file   Housing Stability: Not on file       Family History   Problem Relation Age of Onset    Alzheimer's disease Mother     Heart disease Mother     Colon cancer Mother 61    Rectal cancer Father     Colon cancer Father 80    No Known Problems Maternal Grandmother     No Known Problems Maternal Grandfather     No Known Problems Paternal Grandmother     No Known Problems Paternal Grandfather     Diabetes Son     No Known Problems Son     Cancer Maternal Aunt     Cancer Maternal Aunt     Breast cancer Neg Hx     Ovarian cancer Neg Hx        Blood pressure 116/70, height 5' 3" (1 6 m), weight 76 kg (167 lb 9 6 oz), not currently breastfeeding  and Body mass index is 29 69 kg/m²  Physical Exam  Constitutional:       Appearance: Normal appearance  She is well-developed  HENT:      Head: Normocephalic and atraumatic     Eyes:      Extraocular Movements: Extraocular movements intact  Conjunctiva/sclera: Conjunctivae normal    Neck:      Thyroid: No thyromegaly  Trachea: No tracheal deviation  Cardiovascular:      Rate and Rhythm: Normal rate and regular rhythm  Heart sounds: Normal heart sounds  Pulmonary:      Effort: Pulmonary effort is normal  No respiratory distress  Breath sounds: Normal breath sounds  No stridor  No wheezing or rales  Abdominal:      General: Bowel sounds are normal  There is no distension  Palpations: Abdomen is soft  There is no mass  Tenderness: There is no abdominal tenderness  There is no guarding or rebound  Hernia: No hernia is present  Musculoskeletal:         General: No tenderness  Normal range of motion  Cervical back: Normal range of motion and neck supple  Lymphadenopathy:      Cervical: No cervical adenopathy  Skin:     General: Skin is warm  Findings: No erythema or rash  Comments: Multiple keratosis   Neurological:      Mental Status: She is alert and oriented to person, place, and time  Psychiatric:         Behavior: Behavior normal          Thought Content: Thought content normal          Judgment: Judgment normal           Breasts: breasts appear normal, no suspicious masses, no skin or nipple changes or axillary nodes, symmetric fibrous changes in both upper outer quadrants        vulva: normal external genitalia for age and no lesions, masses, epithelial changes, or exudate  vagina: color pale and rugae  absent rugae  cervix: parous and no lesions   uterus: NSSC, AF, NT, mobile  adnexa: limited by habitus an dguarding and no masses or tenderness  rectum: external hemorrhoids noted and no masses or nodularity    A/P:  Pt is a 78 y o  [id number] with       Denisha Mata was seen today for gynecologic exam     Diagnoses and all orders for this visit:    Encounter for annual routine gynecological examination  -dexa, colonoscopy up to date    Visit for screening mammogram  - Mammo screening bilateral w 3d & cad; Future    Dietary calcium deficiency  Patient advised recommendation of daily dietary calcium of 1200 mg calcium  Inadequate exercise  Patient advised recommendation of exercise 5 times per week for 30 minutes  BMI 29 0-29 9,adult  Patient advised recommendation of BMI to be between 19-25

## 2022-09-21 ENCOUNTER — OFFICE VISIT (OUTPATIENT)
Dept: UROLOGY | Facility: MEDICAL CENTER | Age: 82
End: 2022-09-21
Payer: MEDICARE

## 2022-09-21 VITALS
DIASTOLIC BLOOD PRESSURE: 70 MMHG | HEIGHT: 63 IN | SYSTOLIC BLOOD PRESSURE: 118 MMHG | BODY MASS INDEX: 29.77 KG/M2 | HEART RATE: 74 BPM | WEIGHT: 168 LBS

## 2022-09-21 DIAGNOSIS — D17.71 ANGIOMYOLIPOMA OF RIGHT KIDNEY: Primary | ICD-10-CM

## 2022-09-21 PROCEDURE — 99214 OFFICE O/P EST MOD 30 MIN: CPT | Performed by: PHYSICIAN ASSISTANT

## 2022-09-21 NOTE — PROGRESS NOTES
9/21/2022      Chief Complaint   Patient presents with    Urinary Urgency         Assessment and Plan    78 y o  female managed by Dr Heidy Andrea     1  OAB  · Unable to provide urine sample today  · S/p PTNS in 2018  · Previously managed with Myrbetriq, unable to tolerate secondary to constipation  · Would NOT recommend anticholinergic therapy given patient's age and increased risk of cognitive side effects  · Discussed alternative treatment options including Interstim and Botox  · Patient declines at this time  She currently is satisfied with managing her symptoms with pads  · Reviewed bladder irritants  · Increase daily water intake to 40-60 ounces  · Follow up in 1 year       2  Angiomyolipoma   · Stable on US in 2021  · She does not require annual imaging  · Currently asymptomatic  · Will order renal US for next visit to intermittently monitor AML stability  History of Present Illness  Danielle Keita is a 78 y o  female here for evaluation of OAB and AML  States her overactive bladder symptoms have remained the same since her visit at last year  She continues to have urinary urgency, frequency, and urge urinary incontinence  She has been previously managed on Myrbetriq but had to discontinue this medication secondary to constipation  She completed PTNS therapy in October 2018  Continues to wear 1-2 pads a day and feels this manages her symptoms well  Patient also has a previous history of nephrolithiasis was recommended to avoid iced tea  She does admit to increasing her iced tea intake most recently which may also be contributing to her urinary symptoms  Her renal ultrasound in September of 2021 was negative for any renal calculi  Her AML at that time was stable  Denies any flank or abdominal pain as well as any episodes of gross hematuria  She denies any recent fevers or chills  She is agreeable to plan above      Review of Systems   Constitutional: Negative for chills and fever    HENT: Negative  Respiratory: Negative  Cardiovascular: Negative  Gastrointestinal: Negative for abdominal pain, nausea and vomiting  Genitourinary: Positive for frequency and urgency  Negative for difficulty urinating, dysuria, flank pain and hematuria  Nocturia 2x  Unsure if she fully empties  Urge urinary incontinence  Musculoskeletal: Negative  Skin: Negative  Neurological: Negative  Vitals  Vitals:    09/21/22 0822   BP: 118/70   Pulse: 74   Weight: 76 2 kg (168 lb)   Height: 5' 3" (1 6 m)       Physical Exam  Vitals reviewed  Constitutional:       General: She is not in acute distress  Appearance: Normal appearance  She is not ill-appearing, toxic-appearing or diaphoretic  HENT:      Head: Normocephalic and atraumatic  Eyes:      Conjunctiva/sclera: Conjunctivae normal    Pulmonary:      Effort: Pulmonary effort is normal  No respiratory distress  Abdominal:      General: There is no distension  Palpations: Abdomen is soft  Tenderness: There is no abdominal tenderness  There is no right CVA tenderness, left CVA tenderness, guarding or rebound  Musculoskeletal:         General: Normal range of motion  Cervical back: Normal range of motion  Skin:     General: Skin is warm and dry  Neurological:      General: No focal deficit present  Mental Status: She is alert and oriented to person, place, and time  Psychiatric:         Mood and Affect: Mood normal          Behavior: Behavior normal          Thought Content: Thought content normal          Judgment: Judgment normal        Past History  Past Medical History:   Diagnosis Date    Basal cell carcinoma     chest, thighs    Cancer of the skin, basal cell     Chest and thighs    Constipation     Cystitis     Endometrial mass 11/2017    Endometrial polyp     Last assessed 01/18/18    Fibroids, intramural     2002 and 2008-3 cm fundal with smaller ones   Uterus 9x3x5 cm    Frequency of urination     Glaucoma     b/l    Hematuria, microscopic     Hypercholesteremia     Hypertension     Kidney stone     Macular degeneration     b/l    Morbid obesity (HCC)     Nephrolithiasis     right- in past    Nocturia     OAB (overactive bladder)     Pap smear for cervical cancer screening     denies h o abnormal; 11/2015-wnl, HRHPV neg    PMB (postmenopausal bleeding)     Post-menopausal bleeding 11/2017    Renal mass     Right nephrolithiasis     10/15:5mm sone at right UPJ on CT (prominent uterus, f/u GYN)    Submucous leiomyoma of uterus     Last assessed 01/18/18    Ureteral calculi     Ureteral calculi     Urge incontinence of urine     URI (upper respiratory infection)     resolving, Dr Tarah Kirk aware    Uterine fibroid     UTI (urinary tract infection)     Varicella without complication     Wears glasses      Social History     Socioeconomic History    Marital status: /Civil Union     Spouse name: Laraine Siemens Number of children: 2    Years of education: None    Highest education level:  Bachelor's degree (e g , BA, AB, BS)   Occupational History    None   Tobacco Use    Smoking status: Never Smoker    Smokeless tobacco: Never Used   Vaping Use    Vaping Use: Never used   Substance and Sexual Activity    Alcohol use: No    Drug use: No    Sexual activity: Not Currently     Partners: Male     Comment: lifetime partners: 1   Other Topics Concern    None   Social History Narrative    [employer]    Accepts blood products        Exercise: none    Calcium: 1 cup milk daily, 1 cheese 5x/weekly,      Social Determinants of Health     Financial Resource Strain: Not on file   Food Insecurity: Not on file   Transportation Needs: Not on file   Physical Activity: Not on file   Stress: Not on file   Social Connections: Not on file   Intimate Partner Violence: Not on file   Housing Stability: Not on file     Social History     Tobacco Use   Smoking Status Never Smoker   Smokeless Tobacco Never Used     Family History   Problem Relation Age of Onset    Alzheimer's disease Mother     Heart disease Mother     Colon cancer Mother 61    Rectal cancer Father     Colon cancer Father 80    No Known Problems Maternal Grandmother     No Known Problems Maternal Grandfather     No Known Problems Paternal Grandmother     No Known Problems Paternal Grandfather     Diabetes Son     No Known Problems Son     Cancer Maternal Aunt     Cancer Maternal Aunt     Breast cancer Neg Hx     Ovarian cancer Neg Hx        The following portions of the patient's history were reviewed and updated as appropriate: allergies, current medications, past medical history, past social history, past surgical history and problem list     Results  No results found for this or any previous visit (from the past 1 hour(s))  ]  No results found for: PSA  Lab Results   Component Value Date    CALCIUM 9 2 07/22/2021    K 3 9 07/22/2021    CO2 27 07/22/2021     (H) 07/22/2021    BUN 20 07/22/2021    CREATININE 0 67 07/22/2021     Lab Results   Component Value Date    WBC 6 07 07/22/2021    HGB 15 1 07/22/2021    HCT 47 2 (H) 07/22/2021    MCV 87 07/22/2021     07/22/2021     Gloria Gauthier PA-C

## 2022-10-07 ENCOUNTER — TELEPHONE (OUTPATIENT)
Dept: UROLOGY | Facility: AMBULATORY SURGERY CENTER | Age: 82
End: 2022-10-07

## 2022-10-07 NOTE — TELEPHONE ENCOUNTER
Pt is under the care of Kwaku Lancaster    Pt was last seen on 09/21/22    Dr Barbara Teixeira, the patient's PCP, would like to talk to Kwaku Lancaster about the patient's case  The doctor is in the office today until 2:30-3:00  If not today Monday he is in 9-noon and 1:30 to 4:30      He can be reached reached [phone]

## 2023-06-06 ENCOUNTER — HOSPITAL ENCOUNTER (OUTPATIENT)
Dept: MAMMOGRAPHY | Facility: MEDICAL CENTER | Age: 83
Discharge: HOME | End: 2023-06-06
Payer: MEDICARE

## 2023-06-06 VITALS — WEIGHT: 168 LBS | BODY MASS INDEX: 32.98 KG/M2 | HEIGHT: 60 IN

## 2023-06-06 DIAGNOSIS — Z12.31 VISIT FOR SCREENING MAMMOGRAM: ICD-10-CM

## 2023-06-06 PROCEDURE — 77063 BREAST TOMOSYNTHESIS BI: CPT

## 2023-06-06 PROCEDURE — 77067 SCR MAMMO BI INCL CAD: CPT

## 2023-09-06 ENCOUNTER — ANNUAL EXAM (OUTPATIENT)
Dept: OBGYN CLINIC | Facility: CLINIC | Age: 83
End: 2023-09-06
Payer: MEDICARE

## 2023-09-06 VITALS
HEIGHT: 63 IN | SYSTOLIC BLOOD PRESSURE: 128 MMHG | WEIGHT: 168.8 LBS | BODY MASS INDEX: 29.91 KG/M2 | DIASTOLIC BLOOD PRESSURE: 62 MMHG

## 2023-09-06 DIAGNOSIS — Z12.31 VISIT FOR SCREENING MAMMOGRAM: ICD-10-CM

## 2023-09-06 DIAGNOSIS — Z01.419 ENCOUNTER FOR ANNUAL ROUTINE GYNECOLOGICAL EXAMINATION: Primary | ICD-10-CM

## 2023-09-06 DIAGNOSIS — E58 DIETARY CALCIUM DEFICIENCY: ICD-10-CM

## 2023-09-06 DIAGNOSIS — Z72.3 INADEQUATE EXERCISE: ICD-10-CM

## 2023-09-06 PROCEDURE — G0101 CA SCREEN;PELVIC/BREAST EXAM: HCPCS | Performed by: OBSTETRICS & GYNECOLOGY

## 2023-09-06 NOTE — PROGRESS NOTES
Pt is a78 y.o. Q3P4078 ,menopausal, who presents for preventive care  Partner same ; lifetime  1 partner         safe sexual practices followed: not active     does feel safe in relationship:yes    Dairy:  1 cup milk daily, 1 cheese 5x/weekly, 1 yogurt 2x/week  Vitamin D: n/a  Exercise: none, but active, caring for   Pap:2015-wnl, HRHPV neg;  no longer indicated  Mammogram: 6/6/2023-wnl, repeat 1 year given  Colonoscopy: 7/10/2020-benign colon polyps on pathology from [facility]. Pt reports last colonoscopy 2018 and normal--pt will reach out to Dr Harley Winters and update me after she speaks to her  DEXA: 6/9/2022-osteopenia, repeat 2 years  safety at home:  yes  tobacco use: no    Past Medical History:   Diagnosis Date   • Basal cell carcinoma     chest, thighs   • Cancer of the skin, basal cell     Chest and thighs   • Constipation    • Cystitis    • Endometrial mass 11/2017   • Endometrial polyp     Last assessed 01/18/18   • Fibroids, intramural     2002 and 2008-3 cm fundal with smaller ones.  Uterus 9x3x5 cm   • Frequency of urination    • Glaucoma     b/l   • Hematuria, microscopic    • Hypercholesteremia    • Hypertension    • Kidney stone    • Macular degeneration     b/l   • Morbid obesity ([address])    • Nephrolithiasis     right- in past   • Nocturia    • OAB (overactive bladder)    • Pap smear for cervical cancer screening     denies h.o abnormal; 11/2015-wnl, HRHPV neg   • PMB (postmenopausal bleeding)    • Post-menopausal bleeding 11/2017   • Renal mass    • Right nephrolithiasis     10/15:5mm sone at right UPJ on CT (prominent uterus, f/u GYN)   • Submucous leiomyoma of uterus     Last assessed 01/18/18   • Ureteral calculi    • Ureteral calculi    • Urge incontinence of urine    • URI (upper respiratory infection)     resolving, Dr. Janette Rowell aware   • Uterine fibroid    • UTI (urinary tract infection)    • Varicella without complication    • Wears glasses        Past Surgical History:   Procedure Laterality Date   • COLONOSCOPY      2018-wnl, repeat 5 years d/t fam hx   • CYSTOSCOPY  10/2015    For destruction of right ureteral stone (Ca oxal)   • CYSTOSCOPY W/ LASER LITHOTRIPSY Right    • CYSTOSCOPY W/ URETERAL STENT PLACEMENT Right    • CYSTOSCOPY W/ URETERAL STENT REMOVAL Right     10/15   • EYE SURGERY      macular pucker   • HYSTEROSCOPY  2018    w/removal of submucous leiomyomata- benign endometrium,polyps and myoma   • MALIGNANT SKIN LESION EXCISION      BCCA chest and thighs   • MYOMECTOMY N/A 01/10/2018    Procedure: HYSTEROSCOPY, D&C, HYSTEROSCOPIC MYOMECTOMY;  Dr. Coby Ashford, path: benign   • MYOMECTOMY N/A 2021    Procedure: ENDOCERVICAL POLYPECTOMY,;  Surgeon: Wynette Soulier, MD;  Location[facility];  Service: Gynecology   • OTHER SURGICAL HISTORY  10/2015    Transurethral removal of internally dwelling urteral stent   • CA HYSTEROSCOPY BX ENDOMETRIUM&/POLYPC W/WO D&C N/A 2021    Procedure: D&C, , HYSTEROSCOPY, RESECTION OF ENDOMETRIAL MASSS;  Surgeon: Wynette Soulier, MD;  [facility];  Service: Gynecology   • SKIN LESION EXCISION      Removal- skin back and thighs       Ob Hx:   OB History    Para Term  AB Living   2 2 2     2   SAB IAB Ectopic Multiple Live Births           2      # Outcome Date GA Lbr Cristobal/2nd Weight Sex Delivery Anes PTL Lv   2 Term      Vag-Spont      1 Term      Vag-Spont         Obstetric Comments   Menarche:10      Menopause:46           Current Outpatient Medications:   •  aspirin 81 mg chewable tablet, Chew 81 mg daily, Disp: , Rfl:   •  atorvastatin (LIPITOR) 20 mg tablet, Take 20 mg by mouth daily, Disp: , Rfl:   •  benazepril (LOTENSIN) 10 mg tablet, Take 10 mg by mouth every evening, Disp: , Rfl:   •  Biotin 5 MG CAPS, , Disp: , Rfl:   •  cholecalciferol (VITAMIN D3) 1,000 units tablet, Take 1,000 Units by mouth daily, Disp: , Rfl:   •  dorzolamide-timolol (COSOPT) 22.3-6.8 MG/ML ophthalmic solution, 1 drop 2 (two) times a day, Disp: , Rfl:   •  levothyroxine 25 mcg tablet, Take 25 mcg by mouth daily, Disp: , Rfl:     Allergies   Allergen Reactions   • Latex Blisters and Rash       Social History     Socioeconomic History   • Marital status: /Civil Union     Spouse name: Marika Genao   • Number of children: 2   • Years of education: None   • Highest education level: Bachelor's degree (e.g., BA, AB, BS)   Occupational History   • None   Tobacco Use   • Smoking status: Never   • Smokeless tobacco: Never   Vaping Use   • Vaping Use: Never used   Substance and Sexual Activity   • Alcohol use: No   • Drug use: No   • Sexual activity: Not Currently     Partners: Male     Birth control/protection: Post-menopausal     Comment: lifetime partners: 1   Other Topics Concern   • None   Social History Narrative    Islam    Accepts blood products        Exercise: none    Calcium: 1 cup milk daily, 1 cheese 5x/weekly, 1 yogurt 2x/week     Social Determinants of Health     Financial Resource Strain: Not on file   Food Insecurity: Not on file   Transportation Needs: Not on file   Physical Activity: Not on file   Stress: Not on file   Social Connections: Not on file   Intimate Partner Violence: Not on file   Housing Stability: Not on file       Family History   Problem Relation Age of Onset   • Alzheimer's disease Mother    • Heart disease Mother    • Colon cancer Mother 61   • Rectal cancer Father    • Colon cancer Father 80   • No Known Problems Brother    • No Known Problems Maternal Grandmother    • No Known Problems Maternal Grandfather    • No Known Problems Paternal Grandmother    • No Known Problems Paternal Grandfather    • Diabetes Son    • No Known Problems Son    • Cancer Maternal Aunt         unknown where or when   • Cancer Maternal Aunt         unknown where or wehn   • Breast cancer Neg Hx    • Ovarian cancer Neg Hx        Blood pressure 128/62, height 5' 3" (1.6 m), weight 76.6 kg (168 lb 12.8 oz), not currently breastfeeding.    and Body mass index is 29.9 kg/m². Physical Exam  Constitutional:       General: She is not in acute distress. Appearance: Normal appearance. She is well-developed. She is not ill-appearing. HENT:      Head: Normocephalic and atraumatic. Eyes:      Extraocular Movements: Extraocular movements intact. Conjunctiva/sclera: Conjunctivae normal.   Neck:      Thyroid: No thyromegaly. Trachea: No tracheal deviation. Cardiovascular:      Rate and Rhythm: Normal rate and regular rhythm. Heart sounds: Normal heart sounds. Pulmonary:      Effort: Pulmonary effort is normal. No respiratory distress. Breath sounds: Normal breath sounds. No stridor. No wheezing or rales. Abdominal:      General: Bowel sounds are normal. There is no distension. Palpations: Abdomen is soft. There is no mass. Tenderness: There is no abdominal tenderness. There is no guarding or rebound. Hernia: No hernia is present. Musculoskeletal:         General: No tenderness. Normal range of motion. Cervical back: Normal range of motion and neck supple. Lymphadenopathy:      Cervical: No cervical adenopathy. Skin:     General: Skin is warm. Findings: No erythema or rash. Comments: Multiple lesions noted--pt reports she sees dermatology q 3months   Neurological:      Mental Status: She is alert and oriented to person, place, and time. Psychiatric:         Mood and Affect: Mood normal.         Behavior: Behavior normal.         Thought Content: Thought content normal.         Judgment: Judgment normal.          Breasts: breasts appear normal, no suspicious masses, no skin or nipple changes or axillary nodes, symmetric fibrous changes in both upper outer quadrants.       vulva: normal external genitalia for age and erythema in shape of pad--pt advised she is likely allergic to herpad  vagina: color pale and rugae  absent rugae  cervix: parous and no lesions   uterus: NSSC, AF, NT, mobile  adnexa: limited by habitus and no masses or tenderness  rectum: external hemorrhoids noted and no masses or nodularity    A/P:  Pt is a 78 y.o. A6T3024 with       Blanca Bowser was seen today for gynecologic exam.    Diagnoses and all orders for this visit:    Encounter for annual routine gynecological examination  -stable examination  -colonoscopy may be up to date--pt will check with Dr Leonard Began to confirm  -dexa scan up to date    Visit for screening mammogram  -     Mammo screening bilateral w 3d & cad; Future    Dietary calcium deficiency  Patient advised recommendation of daily dietary calcium of 1200 mg calcium. Inadequate exercise  Patient advised recommendation of exercise 5 times per week for 30 minutes. BMI 29.0-29.9,adult  Patient advised recommendation of BMI to be between 19-25.

## 2024-02-21 PROBLEM — Z01.419 ENCOUNTER FOR ANNUAL ROUTINE GYNECOLOGICAL EXAMINATION: Status: RESOLVED | Noted: 2022-08-11 | Resolved: 2024-02-21

## 2024-06-12 ENCOUNTER — HOSPITAL ENCOUNTER (OUTPATIENT)
Dept: MAMMOGRAPHY | Facility: MEDICAL CENTER | Age: 84
Discharge: HOME | End: 2024-06-12
Payer: MEDICARE

## 2024-06-12 VITALS — WEIGHT: 168.87 LBS | BODY MASS INDEX: 29.92 KG/M2 | HEIGHT: 63 IN

## 2024-06-12 DIAGNOSIS — Z12.31 VISIT FOR SCREENING MAMMOGRAM: ICD-10-CM

## 2024-06-12 PROCEDURE — 77067 SCR MAMMO BI INCL CAD: CPT

## 2024-06-12 PROCEDURE — 77063 BREAST TOMOSYNTHESIS BI: CPT

## 2024-06-24 ENCOUNTER — HOSPITAL ENCOUNTER (OUTPATIENT)
Dept: ULTRASOUND IMAGING | Facility: MEDICAL CENTER | Age: 84
Discharge: HOME | End: 2024-06-24
Payer: MEDICARE

## 2024-06-24 DIAGNOSIS — D17.71 BENIGN LIPOMATOUS NEOPLASM OF KIDNEY: ICD-10-CM

## 2024-06-24 PROCEDURE — 76775 US EXAM ABDO BACK WALL LIM: CPT

## 2024-08-23 ENCOUNTER — HOSPITAL ENCOUNTER (OUTPATIENT)
Dept: BONE DENSITY | Facility: MEDICAL CENTER | Age: 84
Discharge: HOME | End: 2024-08-23
Payer: MEDICARE

## 2024-08-23 DIAGNOSIS — Z78.0 MENOPAUSE: ICD-10-CM

## 2024-08-23 PROCEDURE — 77080 DXA BONE DENSITY AXIAL: CPT

## 2024-09-10 NOTE — PROGRESS NOTES
9/11/2024      Chief Complaint   Patient presents with    Follow-up     Assessment and Plan    84 y.o. female managed by Dr. Pina     1. Urinary frequency  Assessment & Plan:  S/p PTNS in 2018  Reports improvement post treatment  Patient continues to have urgency and frequency   Unable to leave urine ample   PVR- 52  Previously managed with Myrbetriq, unable to tolerate secondary to constipation.   Plan to trial trospium   Patient continues to take miralax every 3 weeks   Discussed increasing to 1 x per week and adding colace   Discussed alternative treatment options including Interstim, Botox, or repeat PTNS given she had a good response and it was covered by insurance   Patient declines at this time- her  has been diagnosed with dementia and is wheelchair bound. She reports this being a hard time  Plan to trial new medication and follow up in 3 months for med check   Can re discuss interstim/botox/PTNS    Orders:  -     POCT Measure PVR  -     trospium chloride (SANCTURA) 20 mg tablet; Take 1 tablet (20 mg total) by mouth 2 (two) times a day  2. 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   Currently asymptomatic   Denies flank pain hematuria or abdominal mass   Can continue to intermittently monitor AML stability        History of Present Illness  Sudha Calzada is a 84 y.o. female here for evaluation of OAB and AML.  States her overactive bladder symptoms have remained the same since her visit at last year.  She continues to have urinary urgency, frequency, and urge urinary incontinence.  She has been previously managed on Myrbetriq but had to discontinue this medication secondary to constipation. She reports using miralax 3 times per week, She completed PTNS therapy in October 2018- good results noted.  Continues to wear 1-2 pads a day and feels this manages her symptoms well.  Patient also  "has a previous history of nephrolithiasis .AML at has been stable.  Denies any flank or abdominal pain as well as any episodes of gross hematuria.  She denies any recent fevers or chills.  She is agreeable to plan above.     Patient reports that her  has been diagnosed with dementia.  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  She reports that this has been a stressful time trying to take care of him and does not think that she would be able to go through with a procedure right now.      Review of Systems   Constitutional:  Negative for chills and fever.   HENT:  Negative for ear pain and sore throat.    Eyes:  Negative for pain and visual disturbance.   Respiratory:  Negative for cough and shortness of breath.    Cardiovascular:  Negative for chest pain and palpitations.   Gastrointestinal:  Negative for abdominal pain and vomiting.   Genitourinary:  Positive for frequency and urgency. Negative for decreased urine volume, difficulty urinating, dysuria, flank pain, hematuria and pelvic pain.   Musculoskeletal:  Negative for arthralgias and back pain.   Skin:  Negative for color change and rash.   Neurological:  Negative for seizures and syncope.   All other systems reviewed and are negative.      Vitals  Vitals:    09/11/24 1052   BP: 130/80   BP Location: Left arm   Patient Position: Sitting   Cuff Size: Standard   Pulse: 92   SpO2: 98%   Weight: 73 kg (161 lb)   Height: 5' 3\" (1.6 m)       Physical Exam  Constitutional:       Appearance: Normal appearance.   HENT:      Head: Normocephalic and atraumatic.   Pulmonary:      Effort: Pulmonary effort is normal.   Musculoskeletal:         General: Normal range of motion.      Cervical back: Normal range of motion.   Neurological:      General: No focal deficit present.      Mental Status: She is alert and oriented to person, place, and time. Mental status is at baseline.   Psychiatric:         Mood and Affect: Mood normal.         " Behavior: Behavior normal.         Thought Content: Thought content normal.           Past History  Past Medical History:   Diagnosis Date    Basal cell carcinoma     chest, thighs    Cancer of the skin, basal cell     Chest and thighs    Constipation     Cystitis     Endometrial mass 11/2017    Endometrial polyp     Last assessed 01/18/18    Fibroids, intramural     2002 and 2008-3 cm fundal with smaller ones. Uterus 9x3x5 cm    Frequency of urination     Glaucoma     b/l    Hematuria, microscopic     Hypercholesteremia     Hypertension     Kidney stone     Macular degeneration     b/l    Morbid obesity (HCC)     Nephrolithiasis     right- in past    Nocturia     OAB (overactive bladder)     Pap smear for cervical cancer screening     denies h.o abnormal; 11/2015-wnl, HRHPV neg    PMB (postmenopausal bleeding)     Post-menopausal bleeding 11/2017    Renal mass     Right nephrolithiasis     10/15:5mm sone at right UPJ on CT (prominent uterus, f/u GYN)    Submucous leiomyoma of uterus     Last assessed 01/18/18    Ureteral calculi     Ureteral calculi     Urge incontinence of urine     URI (upper respiratory infection)     resolving, Dr. gray aware    Uterine fibroid     UTI (urinary tract infection)     Varicella without complication     Wears glasses      Social History     Socioeconomic History    Marital status: /Civil Union     Spouse name: Walker    Number of children: 2    Years of education: None    Highest education level: Bachelor's degree (e.g., BA, AB, BS)   Occupational History    None   Tobacco Use    Smoking status: Never    Smokeless tobacco: Never   Vaping Use    Vaping status: Never Used   Substance and Sexual Activity    Alcohol use: No    Drug use: No    Sexual activity: Not Currently     Partners: Male     Birth control/protection: Post-menopausal     Comment: lifetime partners: 1   Other Topics Concern    None   Social History Narrative    Congregational    Accepts blood products      "   Exercise: none    Calcium: 1 cup milk daily, 1 cheese 5x/weekly, 1 yogurt 2x/week     Social Determinants of Health     Financial Resource Strain: Not on file   Food Insecurity: Not on file   Transportation Needs: Not on file   Physical Activity: Not on file   Stress: Not on file   Social Connections: Not on file   Intimate Partner Violence: Not on file   Housing Stability: Not on file     Social History     Tobacco Use   Smoking Status Never   Smokeless Tobacco Never     Family History   Problem Relation Age of Onset    Alzheimer's disease Mother     Heart disease Mother     Colon cancer Mother 63    Rectal cancer Father     Colon cancer Father 83    No Known Problems Maternal Grandmother     No Known Problems Maternal Grandfather     No Known Problems Paternal Grandmother     No Known Problems Paternal Grandfather     No Known Problems Brother     Diabetes Son     No Known Problems Son     Cancer Maternal Aunt         unknown where or when    Diabetes Maternal Aunt     Cancer Maternal Aunt         unknown where or wehn    Breast cancer Neg Hx     Ovarian cancer Neg Hx        The following portions of the patient's history were reviewed and updated as appropriate: allergies, current medications, past medical history, past social history, past surgical history and problem list.    Results  Recent Results (from the past 1 hour(s))   POCT Measure PVR    Collection Time: 09/11/24 10:59 AM   Result Value Ref Range    POST-VOID RESIDUAL VOLUME, ML POC 52 mL   ]  No results found for: \"PSA\"  Lab Results   Component Value Date    CALCIUM 10.1 06/05/2024    K 4.3 06/05/2024    CO2 28 06/05/2024     06/05/2024    BUN 22 06/05/2024    CREATININE 0.78 06/05/2024     Lab Results   Component Value Date    WBC 6.07 07/22/2021    HGB 15.1 07/22/2021    HCT 47.2 (H) 07/22/2021    MCV 87 07/22/2021     07/22/2021       "

## 2024-09-11 ENCOUNTER — OFFICE VISIT (OUTPATIENT)
Dept: UROLOGY | Facility: CLINIC | Age: 84
End: 2024-09-11
Payer: MEDICARE

## 2024-09-11 VITALS
HEART RATE: 92 BPM | WEIGHT: 161 LBS | SYSTOLIC BLOOD PRESSURE: 130 MMHG | HEIGHT: 63 IN | DIASTOLIC BLOOD PRESSURE: 80 MMHG | BODY MASS INDEX: 28.53 KG/M2 | OXYGEN SATURATION: 98 %

## 2024-09-11 DIAGNOSIS — R35.0 URINARY FREQUENCY: Primary | ICD-10-CM

## 2024-09-11 DIAGNOSIS — D17.9 ANGIOMYOLIPOMA: ICD-10-CM

## 2024-09-11 LAB — POST-VOID RESIDUAL VOLUME, ML POC: 52 ML

## 2024-09-11 PROCEDURE — 99213 OFFICE O/P EST LOW 20 MIN: CPT

## 2024-09-11 PROCEDURE — 51798 US URINE CAPACITY MEASURE: CPT

## 2024-09-11 RX ORDER — TROSPIUM CHLORIDE 20 MG/1
20 TABLET, FILM COATED ORAL 2 TIMES DAILY
Qty: 60 TABLET | Refills: 3 | Status: SHIPPED | OUTPATIENT
Start: 2024-09-11

## 2024-09-11 NOTE — ASSESSMENT & PLAN NOTE
S/p PTNS in 2018  Reports improvement post treatment  Patient continues to have urgency and frequency   Unable to leave urine ample   PVR- 52  Previously managed with Myrbetriq, unable to tolerate secondary to constipation.   Plan to trial trospium   Patient continues to take miralax every 3 weeks   Discussed increasing to 1 x per week and adding colace   Discussed alternative treatment options including Interstim, Botox, or repeat PTNS given she had a good response and it was covered by insurance   Patient declines at this time- her  has been diagnosed with dementia and is wheelchair bound. She reports this being a hard time  Plan to trial new medication and follow up in 3 months for med check   Can re discuss interstim/botox/PTNS then

## 2024-09-11 NOTE — PATIENT INSTRUCTIONS
Start trospium for over active bladder (OAB) we will follow up at the 3 month visit to see if it is helping. We will also check another post void residual at this time to make sure you are emptying okay. If still not content with urination we can further discuss interstim/ PTNS/ botox for (OAB).   Continue with 40-60 oz of water per day   Renal ultrasound- looked very good- only 1 small non-obstructed stone- no need for any intervention.   Miralax increase to 1 time per week     Can add colace which is over the counter. Just softens the stool. Can take this daily.

## 2024-09-11 NOTE — ASSESSMENT & PLAN NOTE
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   Currently asymptomatic   Denies flank pain hematuria or abdominal mass   Can continue to intermittently monitor AML stability

## 2024-09-20 ENCOUNTER — ANNUAL EXAM (OUTPATIENT)
Dept: OBGYN CLINIC | Facility: CLINIC | Age: 84
End: 2024-09-20
Payer: MEDICARE

## 2024-09-20 VITALS
SYSTOLIC BLOOD PRESSURE: 128 MMHG | BODY MASS INDEX: 28.39 KG/M2 | WEIGHT: 160.2 LBS | DIASTOLIC BLOOD PRESSURE: 80 MMHG | HEIGHT: 63 IN

## 2024-09-20 DIAGNOSIS — Z72.3 INADEQUATE EXERCISE: ICD-10-CM

## 2024-09-20 DIAGNOSIS — Z12.31 VISIT FOR SCREENING MAMMOGRAM: ICD-10-CM

## 2024-09-20 DIAGNOSIS — E58 DIETARY CALCIUM DEFICIENCY: ICD-10-CM

## 2024-09-20 DIAGNOSIS — Z01.419 ENCOUNTER FOR ANNUAL ROUTINE GYNECOLOGICAL EXAMINATION: Primary | ICD-10-CM

## 2024-09-20 PROBLEM — R07.89 OTHER CHEST PAIN: Status: ACTIVE | Noted: 2024-09-19

## 2024-09-20 PROBLEM — N95.0 POSTMENOPAUSAL BLEEDING: Status: RESOLVED | Noted: 2018-01-07 | Resolved: 2024-09-20

## 2024-09-20 PROBLEM — R06.09 DYSPNEA ON EXERTION: Status: ACTIVE | Noted: 2024-09-19

## 2024-09-20 PROCEDURE — G0101 CA SCREEN;PELVIC/BREAST EXAM: HCPCS | Performed by: OBSTETRICS & GYNECOLOGY

## 2024-09-20 RX ORDER — CYANOCOBALAMIN (VITAMIN B-12) 500 MCG
1000 TABLET ORAL
COMMUNITY

## 2024-09-20 NOTE — PATIENT INSTRUCTIONS
I recommend 1200 mg calcium daily.  If you are planning on achieving this in your diet, 1 cup of cow's milk is 300 mg  1 cheese = 200 mg  1 yogurt = 200 mg  1 c almond milk = 450 mg  1 c of lactaid with added calcium = 500 mg  [facility] milk

## 2024-09-20 NOTE — PROGRESS NOTES
Pt is a84 y.o.  ,menopausal, who presents for preventive care  Partner same ; lifetime  1 partner         safe sexual practices followed: not active     does feel safe in relationship:yes    Dairy: 1 cup milk daily, 1 cheese 5x/weekly, 1 yogurt 2x/week,  Vitamin D: takes supplement  Exercise: none, but active caring for her len  Pap: -wnl, HRHPV neg, no longer indicated  Mammogram: 2024-wnl, repeat rx for 1 year given  Colonoscopy: 7/10/2020-benign colon polyps, pt reports she spoke with Dr. Lopez and she no longer screening.   DEXA: 2024-osteopenia left femoral neck and forearm-,managed by PCP  safety at home:  yes  tobacco use No    Past Medical History:   Diagnosis Date    Basal cell carcinoma     chest, thighs    Cancer of the skin, basal cell     Chest and thighs    Constipation     Cystitis     Endometrial mass 2017    Endometrial polyp     Last assessed 18    Fibroids, intramural      and -3 cm fundal with smaller ones. Uterus 9x3x5 cm    Frequency of urination     Glaucoma     b/l    Hematuria, microscopic     Hypercholesteremia     Hypertension     Kidney stone     Macular degeneration     b/l    Morbid obesity (HCC)     Nephrolithiasis     right- in past    Nocturia     OAB (overactive bladder)     Pap smear for cervical cancer screening     denies h.o abnormal; 2015-wnl, HRHPV neg    PMB (postmenopausal bleeding)     Post-menopausal bleeding 2017    Renal mass     Right nephrolithiasis     10/15:5mm sone at right UPJ on CT (prominent uterus, f/u GYN)    Submucous leiomyoma of uterus     Last assessed 18    Ureteral calculi     Ureteral calculi     Urge incontinence of urine     URI (upper respiratory infection)     resolving, Dr. gray aware    Uterine fibroid     UTI (urinary tract infection)     Varicella without complication     Wears glasses        Past Surgical History:   Procedure Laterality Date    COLONOSCOPY      -benign polyp, no  longer indicated    CYSTOSCOPY  10/2015    For destruction of right ureteral stone (Ca oxal)    CYSTOSCOPY W/ LASER LITHOTRIPSY Right     CYSTOSCOPY W/ URETERAL STENT PLACEMENT Right     CYSTOSCOPY W/ URETERAL STENT REMOVAL Right     10/15    EYE SURGERY      macular pucker    HYSTEROSCOPY  2018    w/removal of submucous leiomyomata- benign endometrium,polyps and myoma    MALIGNANT SKIN LESION EXCISION      BCCA chest and thighs    MYOMECTOMY N/A 01/10/2018    Procedure: HYSTEROSCOPY, D&C, HYSTEROSCOPIC MYOMECTOMY;  Dr. Delfina Perez, path: benign    MYOMECTOMY N/A 2021    Procedure: ENDOCERVICAL POLYPECTOMY,;  Surgeon: Lucille Leyva MD;  Location: [facility];  Service: Gynecology    OTHER SURGICAL HISTORY  10/2015    Transurethral removal of internally dwelling urteral stent    CO HYSTEROSCOPY BX ENDOMETRIUM&/POLYPC W/WO D&C N/A 2021    Procedure: D&C, , HYSTEROSCOPY, RESECTION OF ENDOMETRIAL MASSS;  Surgeon: Lucille Leyva MD;  Location: [facility];  Service: Gynecology    SKIN LESION EXCISION      Removal- skin back and thighs       Ob Hx:   OB History    Para Term  AB Living   2 2 2     2   SAB IAB Ectopic Multiple Live Births           2      # Outcome Date GA Lbr Cristobal/2nd Weight Sex Type Anes PTL Lv   2 Term      Vag-Spont   ADRIAN   1 Term      Vag-Spont   ADRIAN      Obstetric Comments   Menarche:10      Menopause:46           Current Outpatient Medications:     aspirin 81 mg chewable tablet, Chew 81 mg daily, Disp: , Rfl:     atorvastatin (LIPITOR) 20 mg tablet, Take 20 mg by mouth daily, Disp: , Rfl:     benazepril (LOTENSIN) 10 mg tablet, Take 10 mg by mouth every evening, Disp: , Rfl:     Biotin 5 MG CAPS, , Disp: , Rfl:     cholecalciferol (VITAMIN D3) 1,000 units tablet, Take 1,000 Units by mouth daily, Disp: , Rfl:     Cyanocobalamin (Vitamin B 12) 500 MCG TABS, Take 1,000 mcg by mouth, Disp: , Rfl:     docusate sodium (COLACE) 50 mg capsule, Take 50 mg by mouth 2 (two) times  a day, Disp: , Rfl:     dorzolamide-timolol (COSOPT) 22.3-6.8 MG/ML ophthalmic solution, 1 drop 2 (two) times a day, Disp: , Rfl:     levothyroxine 25 mcg tablet, Take 25 mcg by mouth daily, Disp: , Rfl:     trospium chloride (SANCTURA) 20 mg tablet, Take 1 tablet (20 mg total) by mouth 2 (two) times a day (Patient not taking: Reported on 9/20/2024), Disp: 60 tablet, Rfl: 3    Allergies   Allergen Reactions    Latex Blisters and Rash       Social History     Socioeconomic History    Marital status: /Civil Union     Spouse name: Walker    Number of children: 2    Years of education: None    Highest education level: Bachelor's degree (e.g., BA, AB, BS)   Occupational History    None   Tobacco Use    Smoking status: Never    Smokeless tobacco: Never   Vaping Use    Vaping status: Never Used   Substance and Sexual Activity    Alcohol use: Never    Drug use: No    Sexual activity: Not Currently     Partners: Male     Birth control/protection: Post-menopausal     Comment: lifetime partners: 1   Other Topics Concern    None   Social History Narrative    Jewish    Accepts blood products        Exercise: none, but active caring for her     Calcium: 1 cup milk daily, 1 cheese 5x/weekly, 1 yogurt 2x/week, takes vitamin D daily     Social Determinants of Health     Financial Resource Strain: Not on file   Food Insecurity: Not on file   Transportation Needs: Not on file   Physical Activity: Not on file   Stress: Not on file   Social Connections: Not on file   Intimate Partner Violence: Not on file   Housing Stability: Not on file       Family History   Problem Relation Age of Onset    Alzheimer's disease Mother     Heart disease Mother     Colon cancer Mother 63    Rectal cancer Father     Colon cancer Father 83    No Known Problems Maternal Grandmother     No Known Problems Maternal Grandfather     No Known Problems Paternal Grandmother     No Known Problems Paternal Grandfather     No Known Problems  "Brother     Diabetes Son     No Known Problems Son     Cancer Maternal Aunt         unknown where or when    Diabetes Maternal Aunt     Cancer Maternal Aunt         unknown where or wehn    Breast cancer Neg Hx     Ovarian cancer Neg Hx        Blood pressure 128/80, height 5' 3\" (1.6 m), weight 72.7 kg (160 lb 3.2 oz), not currently breastfeeding. and Body mass index is 28.38 kg/m².    Physical Exam  Constitutional:       General: She is not in acute distress.     Appearance: Normal appearance. She is well-developed. She is not ill-appearing.   HENT:      Head: Normocephalic and atraumatic.   Eyes:      Extraocular Movements: Extraocular movements intact.      Conjunctiva/sclera: Conjunctivae normal.   Neck:      Thyroid: No thyromegaly.      Trachea: No tracheal deviation.   Cardiovascular:      Rate and Rhythm: Normal rate and regular rhythm.      Heart sounds: Normal heart sounds.   Pulmonary:      Effort: Pulmonary effort is normal. No respiratory distress.      Breath sounds: Normal breath sounds. No stridor. No wheezing or rales.   Abdominal:      General: Bowel sounds are normal. There is no distension.      Palpations: Abdomen is soft. There is no mass.      Tenderness: There is no abdominal tenderness. There is no guarding or rebound.      Hernia: No hernia is present.   Musculoskeletal:         General: No tenderness. Normal range of motion.      Cervical back: Normal range of motion and neck supple.   Lymphadenopathy:      Cervical: No cervical adenopathy.   Skin:     General: Skin is warm.      Findings: No erythema or rash.   Neurological:      Mental Status: She is alert and oriented to person, place, and time.   Psychiatric:         Mood and Affect: Mood normal.         Behavior: Behavior normal.         Thought Content: Thought content normal.         Judgment: Judgment normal.          Breasts: breasts appear normal, no suspicious masses, no skin or nipple changes or axillary nodes, symmetric " fibrous changes in both upper outer quadrants.      vulva: normal external genitalia for age and no lesions, masses, epithelial changes, or exudate  vagina: color pale and rugae  flattening of rugae  cervix: no lesions   uterus: NSSC, AF, NT, mobile  adnexa: no masses or tenderness  rectum: no masses or nodularity    A/P:  Pt is a 84 y.o.  with       Sudha was seen today for gynecologic exam.    Diagnoses and all orders for this visit:    Encounter for annual routine gynecological examination  -stable examination  -colonoscopy and dexa up to date    Visit for screening mammogram  -     Mammo screening bilateral w 3d and cad; Future    Dietary calcium deficiency  Patient advised recommendation of daily dietary calcium of 1200 mg calcium.     Inadequate exercise  Patient advised recommendation of exercise 5 times per week for 30 minutes.

## 2024-12-11 ENCOUNTER — TELEPHONE (OUTPATIENT)
Age: 84
End: 2024-12-11

## 2024-12-11 NOTE — TELEPHONE ENCOUNTER
Called and spoke with patient to assist with re-scheduling her follow up to a later afternoon appointment. Patient wishes to schedule in the [place] office as opposed to [place]. Appointment was adjusted to 1/31/25 @ 3:40 pm with LETICIA Palma. 12/16 appointment was cancelled at this time.

## 2024-12-11 NOTE — TELEPHONE ENCOUNTER
Patient has an upcoming 3 month med check  f/u appointment on 12/13/24. Patient is seeking a later time in that same day due to transportation. Pt states that she can make it in any time after 3:15 pm. Next available is for 1/2/25 if the time frame can not be switched.       Pt cb: [phone]

## 2025-01-30 NOTE — PROGRESS NOTES
1/31/2025      Chief Complaint   Patient presents with   • Urinary Frequency       Assessment and Plan    84 y.o. female managed by Dr. Pina     1. Urinary frequency  Assessment & Plan:  S/p PTNS in 2018  Reports improvement post treatment  Patient continues to have urgency and frequency   UA- will send for culture   PVR previous - 52  Previously managed with Myrbetriq, unable to tolerate secondary to constipation.   Plan to trial trospium   Never received medication   Discussed bladder irritants and stopping fluids at least 2 hours before bedtime  Patient continues to take miralax every 3 weeks   Discussed increasing to 1 x per week and adding colace   Discussed alternative treatment options including Interstim, Botox, or repeat PTNS given she had a good response and it was covered by insurance   Patient declines at this time-  passed away about a month ago   Plan to trial new medication and follow up in 2 months for med check   Can re discuss non-invasive options- PFPT   Orders:  -     POCT urine dip auto non-scope  -     Urine culture  2. 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   Currently asymptomatic   Denies flank pain hematuria or abdominal mass   Can continue to intermittently monitor AML stability   3. OAB (overactive bladder)  -     trospium chloride (SANCTURA) 20 mg tablet; Take 1 tablet (20 mg total) by mouth 2 (two) times a day          History of Present Illness  Suhda Calzada is a 84 y.o. female here for evaluation of OAB. Patient was started on trospium. She reports she never received the medication.  Her  was diagnosed with dementia and recently passed away.  She reports that she has been consumed with taking care of him.  She continues with urgency and frequency.  She denies incontinence.  She is aware of bladder irritants.  She would like to trial medication as she  "is not interested in invasive intervention at this time given she is dealing with a lot given the recent passage of her .        She has been previously managed on Myrbetriq but had to discontinue this medication secondary to constipation. She reports using miralax 3 times per week, She completed PTNS therapy in October 2018- good results noted.  Continues to wear 1-2 pads a day and feels this manages her symptoms well.  Patient also has a previous history of nephrolithiasis .AML at has been stable.  Denies any flank or abdominal pain as well as any episodes of gross hematuria.  She denies any recent fevers or chills.  She is agreeable to plan above.     Patient reports that her  has been diagnosed with dementia.  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  She reports that this has been a stressful time trying to take care of him and does not think that she would be able to go through with a procedure right now.      Review of Systems   Constitutional:  Negative for chills and fever.   HENT:  Negative for ear pain and sore throat.    Eyes:  Negative for pain and visual disturbance.   Respiratory:  Negative for cough and shortness of breath.    Cardiovascular:  Negative for chest pain and palpitations.   Gastrointestinal:  Negative for abdominal pain and vomiting.   Genitourinary:  Positive for frequency and urgency. Negative for decreased urine volume, difficulty urinating, dysuria, flank pain and hematuria.   Musculoskeletal:  Negative for arthralgias and back pain.   Skin:  Negative for color change and rash.   Neurological:  Negative for seizures and syncope.   All other systems reviewed and are negative.               Vitals  Vitals:    01/31/25 1543   BP: 136/82   BP Location: Left arm   Patient Position: Sitting   Cuff Size: Standard   Pulse: 77   SpO2: 99%   Height: 5' 3\" (1.6 m)       Physical Exam  Constitutional:       Appearance: Normal appearance.   HENT:      " Head: Normocephalic and atraumatic.   Pulmonary:      Effort: Pulmonary effort is normal.   Musculoskeletal:         General: Normal range of motion.      Cervical back: Normal range of motion.   Neurological:      General: No focal deficit present.      Mental Status: She is alert and oriented to person, place, and time. Mental status is at baseline.   Psychiatric:         Mood and Affect: Mood normal.         Behavior: Behavior normal.         Thought Content: Thought content normal.           Past History  Past Medical History:   Diagnosis Date   • Basal cell carcinoma     chest, thighs   • Cancer of the skin, basal cell     Chest and thighs   • Constipation    • Cystitis    • Endometrial mass 11/2017   • Endometrial polyp     Last assessed 01/18/18   • Fibroids, intramural     2002 and 2008-3 cm fundal with smaller ones. Uterus 9x3x5 cm   • Frequency of urination    • Glaucoma     b/l   • Hematuria, microscopic    • Hypercholesteremia    • Hypertension    • Kidney stone    • Macular degeneration     b/l   • Morbid obesity (HCC)    • Nephrolithiasis     right- in past   • Nocturia    • OAB (overactive bladder)    • Pap smear for cervical cancer screening     denies h.o abnormal; 11/2015-wnl, HRHPV neg   • PMB (postmenopausal bleeding)    • Post-menopausal bleeding 11/2017   • Renal mass    • Right nephrolithiasis     10/15:5mm sone at right UPJ on CT (prominent uterus, f/u GYN)   • Submucous leiomyoma of uterus     Last assessed 01/18/18   • Ureteral calculi    • Ureteral calculi    • Urge incontinence of urine    • URI (upper respiratory infection)     resolving, Dr. gray aware   • Uterine fibroid    • UTI (urinary tract infection)    • Varicella without complication    • Wears glasses      Social History     Socioeconomic History   • Marital status:      Spouse name: Walker   • Number of children: 2   • Years of education: None   • Highest education level: Bachelor's degree (e.g., BA, AB, BS)    Occupational History   • None   Tobacco Use   • Smoking status: Never   • Smokeless tobacco: Never   Vaping Use   • Vaping status: Never Used   Substance and Sexual Activity   • Alcohol use: Never   • Drug use: No   • Sexual activity: Not Currently     Partners: Male     Birth control/protection: Post-menopausal     Comment: lifetime partners: 1   Other Topics Concern   • None   Social History Narrative    Jain    Accepts blood products        Exercise: none, but active caring for her     Calcium: 1 cup milk daily, 1 cheese 5x/weekly, 1 yogurt 2x/week, takes vitamin D daily     Social Drivers of Health     Financial Resource Strain: Not on file   Food Insecurity: Not on file   Transportation Needs: Not on file   Physical Activity: Not on file   Stress: Not on file   Social Connections: Not on file   Intimate Partner Violence: Not on file   Housing Stability: Not on file     Social History     Tobacco Use   Smoking Status Never   Smokeless Tobacco Never     Family History   Problem Relation Age of Onset   • Alzheimer's disease Mother    • Heart disease Mother    • Colon cancer Mother 63   • Rectal cancer Father    • Colon cancer Father 83   • No Known Problems Maternal Grandmother    • No Known Problems Maternal Grandfather    • No Known Problems Paternal Grandmother    • No Known Problems Paternal Grandfather    • No Known Problems Brother    • Diabetes Son    • No Known Problems Son    • Cancer Maternal Aunt         unknown where or when   • Diabetes Maternal Aunt    • Cancer Maternal Aunt         unknown where or wehn   • Breast cancer Neg Hx    • Ovarian cancer Neg Hx        The following portions of the patient's history were reviewed and updated as appropriate: allergies, current medications, past medical history, past social history, past surgical history and problem list.    Results  Recent Results (from the past hour)   POCT urine dip auto non-scope    Collection Time: 01/31/25  4:02 PM  "  Result Value Ref Range     COLOR,UA yellow     CLARITY,UA clear     SPECIFIC GRAVITY,UA 1.020      PH,UA 6.0     LEUKOCYTE ESTERASE,UA small     NITRITE,UA Neg     GLUCOSE, UA Neg     KETONES,UA Neg     BILIRUBIN,UA Neg     BLOOD,UA Neg     POCT URINE PROTEIN Neg     SL AMB POCT UROBILINOGEN 0.2    ]  No results found for: \"PSA\"  Lab Results   Component Value Date    CALCIUM 9.9 11/12/2024    K 3.7 11/12/2024    CO2 27 11/12/2024     11/12/2024    BUN 22 11/12/2024    CREATININE 0.82 11/12/2024     Lab Results   Component Value Date    WBC 6.07 07/22/2021    HGB 15.1 07/22/2021    HCT 47.2 (H) 07/22/2021    MCV 87 07/22/2021     07/22/2021       "

## 2025-01-31 ENCOUNTER — OFFICE VISIT (OUTPATIENT)
Dept: UROLOGY | Facility: MEDICAL CENTER | Age: 85
End: 2025-01-31
Payer: MEDICARE

## 2025-01-31 VITALS
OXYGEN SATURATION: 99 % | HEART RATE: 77 BPM | HEIGHT: 63 IN | SYSTOLIC BLOOD PRESSURE: 136 MMHG | BODY MASS INDEX: 28.38 KG/M2 | DIASTOLIC BLOOD PRESSURE: 82 MMHG

## 2025-01-31 DIAGNOSIS — D17.9 ANGIOMYOLIPOMA: ICD-10-CM

## 2025-01-31 DIAGNOSIS — R35.0 URINARY FREQUENCY: Primary | ICD-10-CM

## 2025-01-31 DIAGNOSIS — N32.81 OAB (OVERACTIVE BLADDER): ICD-10-CM

## 2025-01-31 PROCEDURE — 87086 URINE CULTURE/COLONY COUNT: CPT

## 2025-01-31 PROCEDURE — 99213 OFFICE O/P EST LOW 20 MIN: CPT

## 2025-01-31 PROCEDURE — 81003 URINALYSIS AUTO W/O SCOPE: CPT

## 2025-01-31 RX ORDER — CLOPIDOGREL BISULFATE 75 MG/1
75 TABLET ORAL DAILY
COMMUNITY
Start: 2024-11-14 | End: 2025-11-14

## 2025-01-31 RX ORDER — TROSPIUM CHLORIDE 20 MG/1
20 TABLET, FILM COATED ORAL 2 TIMES DAILY
Qty: 180 TABLET | Refills: 2 | Status: SHIPPED | OUTPATIENT
Start: 2025-01-31

## 2025-01-31 NOTE — ASSESSMENT & PLAN NOTE
S/p PTNS in 2018  Reports improvement post treatment  Patient continues to have urgency and frequency   UA- will send for culture   PVR previous - 52  Previously managed with Myrbetriq, unable to tolerate secondary to constipation.   Plan to trial trospium   Never received medication   Discussed bladder irritants and stopping fluids at least 2 hours before bedtime  Patient continues to take miralax every 3 weeks   Discussed increasing to 1 x per week and adding colace   Discussed alternative treatment options including Interstim, Botox, or repeat PTNS given she had a good response and it was covered by insurance   Patient declines at this time-  passed away about a month ago   Plan to trial new medication and follow up in 2 months for med check   Can re discuss non-invasive options- PFPT

## 2025-02-01 LAB — BACTERIA UR CULT: NORMAL

## 2025-03-27 NOTE — PROGRESS NOTES
3/28/2025      Chief Complaint   Patient presents with   • Urinary Frequency       Assessment and Plan    84 y.o. female managed by Dr. Pina       1. OAB (overactive bladder)  Assessment & Plan:  Oab med check   Patient trialed on trospium due to intolerance with Myrbetriq secondary to constipation  Continues to manage constipation with over-the-counter medication  Patient reports only taking 1 tropsium but reports much improvement with medication   Plan to continue with trospium once daily  Aware to call for worsening symptoms or urinary tract symptoms  Plan to follow-up in 1 year  2. 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   Currently asymptomatic   Denies flank pain hematuria or abdominal mass   Can continue to intermittently monitor AML stability         History of Present Illness  Sudha Calzada is a 84 y.o. female here for evaluation of OAB. Patient was started on trospium. She reports she never received the medication.  Her  was diagnosed with dementia and recently passed away.  She reports that she has been consumed with taking care of him.  She continues with urgency and frequency.  She denies incontinence.  She is aware of bladder irritants.  She would like to trial medication as she is not interested in invasive intervention at this time given she is dealing with a lot given the recent passage of her .     She has been previously managed on Myrbetriq but had to discontinue this medication secondary to constipation. She reports using miralax 3 times per week, She completed PTNS therapy in October 2018- good results noted.  Continues to wear 1-2 pads a day and feels this manages her symptoms well.  Patient also has a previous history of nephrolithiasis .AML at has been stable.  Denies any flank or abdominal pain as well as any episodes of gross hematuria.  She denies any  "recent fevers or chills.  She is agreeable to plan above.       Today:   At today's visit, patient reports only taking trospium once daily.  She reports much improvement with her urinary symptoms and reports being very content.  She is nervous to take twice daily due to history of constipation.  She continues to take over-the-counter supplements for her constipation.  She reports no issues with constipation with trospium she wishes to continue with 1 daily.  She reports improvement with her frequency and urgency and is able to go out and not had incontinence episodes.  She reports still being active with her friends and has been getting out of the house more.  She is still dealing with the the loss of her  but she is in good spirits.        Review of Systems   Constitutional:  Negative for chills and fever.   HENT:  Negative for ear pain and sore throat.    Eyes:  Negative for pain and visual disturbance.   Respiratory:  Negative for cough and shortness of breath.    Cardiovascular:  Negative for chest pain and palpitations.   Gastrointestinal:  Negative for abdominal pain and vomiting.   Genitourinary:  Negative for decreased urine volume, difficulty urinating, dysuria, flank pain, frequency, hematuria and urgency.   Musculoskeletal:  Negative for arthralgias and back pain.   Skin:  Negative for color change and rash.   Neurological:  Negative for seizures and syncope.   All other systems reviewed and are negative.      Vitals  Vitals:    03/28/25 1427   BP: 100/80   BP Location: Left arm   Patient Position: Sitting   Cuff Size: Standard   Pulse: 88   SpO2: 99%   Weight: 70.8 kg (156 lb)   Height: 5' 3\" (1.6 m)       Physical Exam  Constitutional:       Appearance: Normal appearance.   HENT:      Head: Normocephalic and atraumatic.   Pulmonary:      Effort: Pulmonary effort is normal.   Musculoskeletal:         General: Normal range of motion.      Cervical back: Normal range of motion.   Neurological:      " General: No focal deficit present.      Mental Status: She is alert and oriented to person, place, and time. Mental status is at baseline.   Psychiatric:         Mood and Affect: Mood normal.         Behavior: Behavior normal.         Thought Content: Thought content normal.           Past History  Past Medical History:   Diagnosis Date   • Basal cell carcinoma     chest, thighs   • Cancer of the skin, basal cell     Chest and thighs   • Constipation    • Cystitis    • Endometrial mass 11/2017   • Endometrial polyp     Last assessed 01/18/18   • Fibroids, intramural     2002 and 2008-3 cm fundal with smaller ones. Uterus 9x3x5 cm   • Frequency of urination    • Glaucoma     b/l   • Hematuria, microscopic    • Hypercholesteremia    • Hypertension    • Kidney stone    • Macular degeneration     b/l   • Morbid obesity (HCC)    • Nephrolithiasis     right- in past   • Nocturia    • OAB (overactive bladder)    • Pap smear for cervical cancer screening     denies h.o abnormal; 11/2015-wnl, HRHPV neg   • PMB (postmenopausal bleeding)    • Post-menopausal bleeding 11/2017   • Renal mass    • Right nephrolithiasis     10/15:5mm sone at right UPJ on CT (prominent uterus, f/u GYN)   • Submucous leiomyoma of uterus     Last assessed 01/18/18   • Ureteral calculi    • Ureteral calculi    • Urge incontinence of urine    • URI (upper respiratory infection)     resolving, Dr. gray aware   • Uterine fibroid    • UTI (urinary tract infection)    • Varicella without complication    • Wears glasses      Social History     Socioeconomic History   • Marital status:      Spouse name: aWlker   • Number of children: 2   • Years of education: None   • Highest education level: Bachelor's degree (e.g., BA, AB, BS)   Occupational History   • None   Tobacco Use   • Smoking status: Never   • Smokeless tobacco: Never   Vaping Use   • Vaping status: Never Used   Substance and Sexual Activity   • Alcohol use: Never   • Drug use: No   •  "Sexual activity: Not Currently     Partners: Male     Birth control/protection: Post-menopausal     Comment: lifetime partners: 1   Other Topics Concern   • None   Social History Narrative    Jain    Accepts blood products        Exercise: none, but active caring for her     Calcium: 1 cup milk daily, 1 cheese 5x/weekly, 1 yogurt 2x/week, takes vitamin D daily     Social Drivers of Health     Financial Resource Strain: Not on file   Food Insecurity: Not on file   Transportation Needs: Not on file   Physical Activity: Not on file   Stress: Not on file   Social Connections: Not on file   Intimate Partner Violence: Not on file   Housing Stability: Not on file     Social History     Tobacco Use   Smoking Status Never   Smokeless Tobacco Never     Family History   Problem Relation Age of Onset   • Alzheimer's disease Mother    • Heart disease Mother    • Colon cancer Mother 63   • Rectal cancer Father    • Colon cancer Father 83   • No Known Problems Maternal Grandmother    • No Known Problems Maternal Grandfather    • No Known Problems Paternal Grandmother    • No Known Problems Paternal Grandfather    • No Known Problems Brother    • Diabetes Son    • No Known Problems Son    • Cancer Maternal Aunt         unknown where or when   • Diabetes Maternal Aunt    • Cancer Maternal Aunt         unknown where or wehn   • Breast cancer Neg Hx    • Ovarian cancer Neg Hx        The following portions of the patient's history were reviewed and updated as appropriate: allergies, current medications, past medical history, past social history, past surgical history and problem list.    Results  No results found for this or any previous visit (from the past hour).]  No results found for: \"PSA\"  Lab Results   Component Value Date    CALCIUM 9.5 01/31/2025    K 4 01/31/2025    CO2 29 01/31/2025     01/31/2025    BUN 27 (H) 01/31/2025    CREATININE 0.73 01/31/2025     Lab Results   Component Value Date    WBC 6.07 " 07/22/2021    HGB 15.1 07/22/2021    HCT 47.2 (H) 07/22/2021    MCV 87 07/22/2021     07/22/2021

## 2025-03-28 ENCOUNTER — OFFICE VISIT (OUTPATIENT)
Dept: UROLOGY | Facility: MEDICAL CENTER | Age: 85
End: 2025-03-28

## 2025-03-28 VITALS
DIASTOLIC BLOOD PRESSURE: 80 MMHG | HEIGHT: 63 IN | OXYGEN SATURATION: 99 % | BODY MASS INDEX: 27.64 KG/M2 | WEIGHT: 156 LBS | HEART RATE: 88 BPM | SYSTOLIC BLOOD PRESSURE: 100 MMHG

## 2025-03-28 DIAGNOSIS — N32.81 OAB (OVERACTIVE BLADDER): Primary | ICD-10-CM

## 2025-03-28 DIAGNOSIS — D17.9 ANGIOMYOLIPOMA: ICD-10-CM

## 2025-03-28 RX ORDER — ATORVASTATIN CALCIUM 40 MG/1
40 TABLET, FILM COATED ORAL
COMMUNITY
Start: 2025-02-07

## 2025-03-28 NOTE — ASSESSMENT & PLAN NOTE
Oab med check   Patient trialed on trospium due to intolerance with Myrbetriq secondary to constipation  Continues to manage constipation with over-the-counter medication  Patient reports only taking 1 tropsium but reports much improvement with medication   Plan to continue with trospium once daily  Aware to call for worsening symptoms or urinary tract symptoms  Plan to follow-up in 1 year

## 2025-07-29 ENCOUNTER — HOSPITAL ENCOUNTER (OUTPATIENT)
Dept: MAMMOGRAPHY | Facility: MEDICAL CENTER | Age: 85
Discharge: HOME | End: 2025-07-29
Attending: OBSTETRICS & GYNECOLOGY
Payer: MEDICARE

## 2025-07-29 VITALS — BODY MASS INDEX: 32.39 KG/M2 | WEIGHT: 165 LBS | HEIGHT: 60 IN

## 2025-07-29 DIAGNOSIS — Z12.31 VISIT FOR SCREENING MAMMOGRAM: ICD-10-CM

## 2025-07-29 PROCEDURE — 77067 SCR MAMMO BI INCL CAD: CPT

## 2025-07-29 PROCEDURE — 77063 BREAST TOMOSYNTHESIS BI: CPT

## (undated) DEVICE — GLOVE INDICATOR PI UNDERGLOVE SZ 8 BLUE

## (undated) DEVICE — MYOSURE SEAL SET

## (undated) DEVICE — Device

## (undated) DEVICE — 3000CC GUARDIAN II: Brand: GUARDIAN

## (undated) DEVICE — SCD SEQUENTIAL COMPRESSION COMFORT SLEEVE MEDIUM KNEE LENGTH: Brand: KENDALL SCD

## (undated) DEVICE — PACK FLUENT DISP

## (undated) DEVICE — PVC URETHRAL CATHETER: Brand: DOVER

## (undated) DEVICE — STRL ALLENTOWN HYSTEROSCOPY PK: Brand: CARDINAL HEALTH

## (undated) DEVICE — 2000CC GUARDIAN II: Brand: GUARDIAN

## (undated) DEVICE — GLOVE INDICATOR PI UNDERGLOVE SZ 7.5 BLUE

## (undated) DEVICE — GLOVE PI ULTRA TOUCH SZ.7.0

## (undated) DEVICE — GLOVE INDICATOR PI UNDERGLOVE SZ 6.5 BLUE

## (undated) DEVICE — GLOVE INDICATOR PI UNDERGLOVE SZ 7 BLUE

## (undated) DEVICE — DEVICE MYOSURE TISSUE REMOVAL HYSTEROSCOPIC XLRG

## (undated) DEVICE — PREMIUM DRY TRAY LF: Brand: MEDLINE INDUSTRIES, INC.

## (undated) DEVICE — BETHLEHEM UNIVERSAL MINOR VAG: Brand: CARDINAL HEALTH

## (undated) DEVICE — UNDER BUTTOCKS DRAPE W/FLUID CONTROL POUCH: Brand: CONVERTORS